# Patient Record
Sex: MALE | Race: BLACK OR AFRICAN AMERICAN | NOT HISPANIC OR LATINO | Employment: UNEMPLOYED | URBAN - METROPOLITAN AREA
[De-identification: names, ages, dates, MRNs, and addresses within clinical notes are randomized per-mention and may not be internally consistent; named-entity substitution may affect disease eponyms.]

---

## 2019-10-06 ENCOUNTER — HOSPITAL ENCOUNTER (EMERGENCY)
Facility: MEDICAL CENTER | Age: 24
End: 2019-10-06
Attending: EMERGENCY MEDICINE

## 2019-10-06 VITALS
TEMPERATURE: 98.6 F | SYSTOLIC BLOOD PRESSURE: 109 MMHG | DIASTOLIC BLOOD PRESSURE: 57 MMHG | RESPIRATION RATE: 14 BRPM | HEART RATE: 65 BPM | OXYGEN SATURATION: 95 %

## 2019-10-06 DIAGNOSIS — F10.929 ACUTE ALCOHOLIC INTOXICATION WITH COMPLICATION (HCC): ICD-10-CM

## 2019-10-06 DIAGNOSIS — F10.929 ALCOHOLIC INTOXICATION WITH COMPLICATION (HCC): ICD-10-CM

## 2019-10-06 DIAGNOSIS — R11.2 NAUSEA AND VOMITING, INTRACTABILITY OF VOMITING NOT SPECIFIED, UNSPECIFIED VOMITING TYPE: ICD-10-CM

## 2019-10-06 PROCEDURE — 96374 THER/PROPH/DIAG INJ IV PUSH: CPT

## 2019-10-06 PROCEDURE — 700105 HCHG RX REV CODE 258: Performed by: EMERGENCY MEDICINE

## 2019-10-06 PROCEDURE — 700111 HCHG RX REV CODE 636 W/ 250 OVERRIDE (IP): Performed by: EMERGENCY MEDICINE

## 2019-10-06 PROCEDURE — 99285 EMERGENCY DEPT VISIT HI MDM: CPT

## 2019-10-06 RX ORDER — ONDANSETRON 2 MG/ML
4 INJECTION INTRAMUSCULAR; INTRAVENOUS ONCE
Status: COMPLETED | OUTPATIENT
Start: 2019-10-06 | End: 2019-10-06

## 2019-10-06 RX ORDER — SODIUM CHLORIDE 9 MG/ML
1000 INJECTION, SOLUTION INTRAVENOUS ONCE
Status: COMPLETED | OUTPATIENT
Start: 2019-10-06 | End: 2019-10-06

## 2019-10-06 RX ADMIN — SODIUM CHLORIDE 1000 ML: 9 INJECTION, SOLUTION INTRAVENOUS at 05:45

## 2019-10-06 RX ADMIN — ONDANSETRON 4 MG: 2 INJECTION INTRAMUSCULAR; INTRAVENOUS at 05:45

## 2019-10-06 ASSESSMENT — LIFESTYLE VARIABLES: DO YOU DRINK ALCOHOL: NO

## 2019-10-06 NOTE — ED PROVIDER NOTES
ED Provider Note    Scribed for Daryl Richardson M.D. by Dariel Daniels. 10/6/2019  4:14 AM    Primary care provider: None noted  Means of arrival: EMS  History obtained from: Primarily nursing  History limited by: Alcohol intoxication    CHIEF COMPLAINT  Chief Complaint   Patient presents with   • Alcohol Intoxication   • N/V       HPI  Sabino Fuchs is a 24 y.o. male who presents to the Emergency Department for evaluation of alcohol intoxication. Per nursing, patient was found sitting in a pool of emesis in the bathroom at [facility]. He admitted to alcohol consumption. He has associated nausea and vomiting. There was no trauma involved, per nursing. He was drowsy on arrival.    Further history is unobtainable secondary to alcohol intoxication.    REVIEW OF SYSTEMS  Pertinent positives include alcohol intoxication, nausea, vomiting.   Pertinent negatives include no trauma.    See HPI for further details.     Further ROS is unobtainable secondary to alcohol intoxication.    PAST MEDICAL HISTORY   Unobtainable at this time secondary to alcohol intoxication.    SURGICAL HISTORY  patient denies any surgical history    SOCIAL HISTORY  Social History     Tobacco Use   • Smoking status: Never Smoker   • Smokeless tobacco: Never Used   Substance Use Topics   • Alcohol use: Yes   • Drug use: Never      Social History     Substance and Sexual Activity   Drug Use Never       FAMILY HISTORY  History reviewed. No pertinent family history.    CURRENT MEDICATIONS  Reviewed.  See Encounter Summary.     ALLERGIES  No Known Allergies    PHYSICAL EXAM  VITAL SIGNS: /79   Pulse 76   Temp 36.5 °C (97.7 °F) (Temporal)   Resp 15   SpO2 100%   Nursing note and vitals reviewed.  Constitutional: Well-developed and well-nourished. No distress. Smells of alcohol.  HENT: Head is normocephalic and atraumatic. Oropharynx is clear and moist without exudate or erythema.   Eyes: Pupils are equal, round, and reactive to light.  Conjunctiva are normal.   Cardiovascular: Normal rate and regular rhythm. No murmur heard. Normal radial pulses.  Pulmonary/Chest: Breath sounds normal. No wheezes or rales.   Abdominal: Soft and non-tender. No distention    Musculoskeletal: Extremities exhibit normal range of motion without edema or tenderness.   Neurological: Arousable to painful stimulus.. No focal deficits noted.  Skin: Skin is warm and dry. No rash.   Psychiatric: Intoxicated.    COURSE & MEDICAL DECISION MAKING  Nursing notes, VS, PMSFHx reviewed in chart.     Review of past medical records shows the patient has no prior records.     4:14 AM - Patient seen and examined at bedside. He presents for alcohol intoxication. No evidence of trauma. He will be treated with zofran for nausea and vomiting. Will continue to monitor. Ordered zofran 4 mg. The differential diagnoses include but are not limited to: Alcohol intoxication    The patient was observed in the emergency department until he regained sobriety.  He is able to ambulate about the emergency department is stable unassisted gait.  Discharged home in improved condition.  HYDRATION: Based on the patient's presentation of Acute Vomiting the patient was given IV fluids. IV Hydration was used because oral hydration was not adequate alone. Upon recheck following hydration, the patient was Improved.     The patient will return for new or worsening symptoms and is stable at the time of discharge.    The patient is referred to a primary physician for blood pressure management, diabetic screening, and for all other preventative health concerns.    DISPOSITION:  Patient will be discharged home in stable condition.    FOLLOW UP:  [facility], Emergency Dept  [address] [address] [phone]    If symptoms worsen       FINAL IMPRESSION  1. Acute alcoholic intoxication with complication (HCC)    2. Nausea and vomiting, intractability of vomiting not specified,  unspecified vomiting type    3. Alcoholic intoxication with complication (HCC)          IDariel (Scribe), am scribing for, and in the presence of, Daryl Richardson M.D..    Electronically signed by: Dariel Daniels (Scribe), 10/6/2019    IDaryl M.D. personally performed the services described in this documentation, as scribed by Dariel Daniels in my presence, and it is both accurate and complete. C    The note accurately reflects work and decisions made by me.  Daryl Richardson  10/6/2019  11:05 AM

## 2019-10-06 NOTE — ED NOTES
Report received from night RN jean claude, pt. In bed with eyes closed, no distress noted, safety precautions in room maintained, will continue to monitor. Care taken over at this time.

## 2019-10-06 NOTE — ED NOTES
Pt. Ambulated around blue pod, pt. Ambulated independently and with a steady gait, pt. Provided with discharge paperwork and teaching, pt. Denies needs or questions. Registrations seeing patient prior to discharge.

## 2019-10-06 NOTE — ED NOTES
Pt observed resting in gurney at this time, presumably sleeping, no s/s of discomfort or distress at this time, unlabored breathing/visible chest  Rise noted, pt repositions self occasionally. Bed in lowest position, room & floor clear, pt in line of sight from RN station.

## 2019-10-06 NOTE — ED TRIAGE NOTES
Pt BIBA from [facility] in bathroom, sitting in pool of emesis. Pt admits to ETOH, continue N/V with EMS. EMS gave 4 mg zofran IV. VSS. Pt is drowsy on arrival.

## 2022-07-05 ENCOUNTER — OFFICE VISIT (OUTPATIENT)
Dept: URGENT CARE | Facility: CLINIC | Age: 27
End: 2022-07-05
Payer: MEDICAID

## 2022-07-05 ENCOUNTER — APPOINTMENT (OUTPATIENT)
Dept: LAB | Facility: MEDICAL CENTER | Age: 27
End: 2022-07-05
Payer: MEDICAID

## 2022-07-05 ENCOUNTER — HOSPITAL ENCOUNTER (OUTPATIENT)
Facility: MEDICAL CENTER | Age: 27
End: 2022-07-05
Payer: MEDICAID

## 2022-07-05 VITALS
RESPIRATION RATE: 14 BRPM | HEART RATE: 57 BPM | WEIGHT: 143 LBS | BODY MASS INDEX: 21.18 KG/M2 | OXYGEN SATURATION: 99 % | TEMPERATURE: 98.7 F | DIASTOLIC BLOOD PRESSURE: 76 MMHG | SYSTOLIC BLOOD PRESSURE: 110 MMHG | HEIGHT: 69 IN

## 2022-07-05 DIAGNOSIS — Z20.2 STD EXPOSURE: ICD-10-CM

## 2022-07-05 PROCEDURE — 99213 OFFICE O/P EST LOW 20 MIN: CPT

## 2022-07-05 RX ORDER — DOXYCYCLINE 100 MG/1
100 CAPSULE ORAL 2 TIMES DAILY
Qty: 14 CAPSULE | Refills: 0 | Status: SHIPPED | OUTPATIENT
Start: 2022-07-05 | End: 2022-07-12

## 2022-07-05 NOTE — PROGRESS NOTES
"Subjective:   Sabino Fuchs is a 27 y.o. male who presents for Exposure to STD (Pt. Was informed of a partner tested Positive for Chlamydia. )      HPI:  This is a 27-year-old male patient who reports exposure to chlamydia, and is requesting treatment today.  He is also requesting additional STI screening.  He reports having multiple partners.  He denies any fevers, chills, urinary symptoms.      ROS per HPI  Medications:    No current outpatient medications on file prior to visit.     No current facility-administered medications on file prior to visit.        Allergies:   Patient has no known allergies.    Problem List:   There is no problem list on file for this patient.       Surgical History:  No past surgical history on file.    Past Social Hx:   Social History     Tobacco Use   • Smoking status: Never Smoker   • Smokeless tobacco: Never Used   Vaping Use   • Vaping Use: Never used   Substance Use Topics   • Alcohol use: Yes   • Drug use: Never          Problem list, medications, and allergies reviewed by myself today in Epic.     Objective:     /76   Pulse (!) 57   Temp 37.1 °C (98.7 °F) (Temporal)   Resp 14   Ht 1.746 m (5' 8.75\")   Wt 64.9 kg (143 lb)   SpO2 99%   BMI 21.27 kg/m²     Physical Exam  Vitals and nursing note reviewed.   Constitutional:       General: He is not in acute distress.     Appearance: Normal appearance. He is normal weight. He is not ill-appearing or toxic-appearing.   HENT:      Head: Normocephalic and atraumatic.   Cardiovascular:      Rate and Rhythm: Normal rate and regular rhythm.      Pulses: Normal pulses.      Heart sounds: Normal heart sounds.   Pulmonary:      Effort: Pulmonary effort is normal.      Breath sounds: Normal breath sounds.   Skin:     Capillary Refill: Capillary refill takes less than 2 seconds.   Neurological:      General: No focal deficit present.      Mental Status: He is alert and oriented to person, place, and time. Mental status is at baseline. "   Psychiatric:         Mood and Affect: Mood normal.         Behavior: Behavior normal.         Thought Content: Thought content normal.         Judgment: Judgment normal.         Assessment/Plan:     Diagnosis and associated orders:     1. STD exposure  cefTRIAXone (ROCEPHIN) 500 mg, lidocaine (XYLOCAINE) 1 % 1.8 mL for IM use    doxycycline (MONODOX) 100 MG capsule    Chlamydia/GC, PCR (Urine)    HEPATITIS PANEL ACUTE(4 COMPONENTS)    HIV AG/AB COMBO ASSAY SCREENING    T.PALLIDUM AB EIA    Referral to establish with [facility] PCP    POCT Urinalysis      Comments/MDM:   Acute problem  Patient will be treated today for STI exposure  STI screening per patient request  Referral to establish with primary care           • Discussed DDx, management options (risks,benefits, and alternatives to planned treatment), natural progression and supportive care.  Expressed understanding and the treatment plan was agreed upon. Questions were encouraged and answered   • Return to urgent care prn if new or worsening sx or if there is no improvement in condition prn.     • Advised the patient to follow-up with the primary care physician for recheck, reevaluation, and consideration of further management.    I personally reviewed prior external notes and test results pertinent to today's visit.  I have independently reviewed and interpreted all diagnostics ordered during this urgent care acute visit.       Please note that this dictation was created using voice recognition software. I have made a reasonable attempt to correct obvious errors, but I expect that there are errors of grammar and possibly content that I did not discover before finalizing the note.    This note was electronically signed by TOMY Moser

## 2022-07-06 LAB
FORWARD REASON: SPWHY: NORMAL
FORWARDED TO LAB: SPWHR: NORMAL
SPECIMEN SENT: SPWT1: NORMAL

## 2022-07-16 ENCOUNTER — TELEPHONE (OUTPATIENT)
Dept: URGENT CARE | Facility: PHYSICIAN GROUP | Age: 27
End: 2022-07-16
Payer: MEDICAID

## 2022-07-16 NOTE — TELEPHONE ENCOUNTER
----- Message from JESSICA Marina sent at 7/7/2022 11:29 AM PDT -----  Establish care with Primary Care Physician.

## 2022-08-18 ENCOUNTER — HOSPITAL ENCOUNTER (OUTPATIENT)
Facility: MEDICAL CENTER | Age: 27
End: 2022-08-18
Attending: PHYSICIAN ASSISTANT
Payer: MEDICAID

## 2022-08-18 ENCOUNTER — HOSPITAL ENCOUNTER (OUTPATIENT)
Dept: RADIOLOGY | Facility: MEDICAL CENTER | Age: 27
End: 2022-08-18
Attending: PHYSICIAN ASSISTANT
Payer: MEDICAID

## 2022-08-18 ENCOUNTER — OFFICE VISIT (OUTPATIENT)
Dept: URGENT CARE | Facility: CLINIC | Age: 27
End: 2022-08-18
Payer: MEDICAID

## 2022-08-18 VITALS
HEART RATE: 60 BPM | HEIGHT: 68 IN | SYSTOLIC BLOOD PRESSURE: 122 MMHG | BODY MASS INDEX: 21.22 KG/M2 | WEIGHT: 140 LBS | RESPIRATION RATE: 14 BRPM | OXYGEN SATURATION: 100 % | TEMPERATURE: 97.5 F | DIASTOLIC BLOOD PRESSURE: 78 MMHG

## 2022-08-18 DIAGNOSIS — M79.642 LEFT HAND PAIN: ICD-10-CM

## 2022-08-18 DIAGNOSIS — M25.571 ACUTE RIGHT ANKLE PAIN: ICD-10-CM

## 2022-08-18 DIAGNOSIS — Z71.1 CONCERN ABOUT STD IN MALE WITHOUT DIAGNOSIS: ICD-10-CM

## 2022-08-18 DIAGNOSIS — S82.399A: ICD-10-CM

## 2022-08-18 LAB
APPEARANCE UR: NORMAL
BILIRUB UR STRIP-MCNC: NORMAL MG/DL
COLOR UR AUTO: NORMAL
GLUCOSE UR STRIP.AUTO-MCNC: NEGATIVE MG/DL
KETONES UR STRIP.AUTO-MCNC: NORMAL MG/DL
LEUKOCYTE ESTERASE UR QL STRIP.AUTO: NEGATIVE
NITRITE UR QL STRIP.AUTO: NEGATIVE
PH UR STRIP.AUTO: 7 [PH] (ref 5–8)
PROT UR QL STRIP: NEGATIVE MG/DL
RBC UR QL AUTO: NEGATIVE
SP GR UR STRIP.AUTO: 1.02
UROBILINOGEN UR STRIP-MCNC: 0.2 MG/DL

## 2022-08-18 PROCEDURE — 73130 X-RAY EXAM OF HAND: CPT | Mod: LT

## 2022-08-18 PROCEDURE — 73610 X-RAY EXAM OF ANKLE: CPT | Mod: RT

## 2022-08-18 PROCEDURE — 81002 URINALYSIS NONAUTO W/O SCOPE: CPT | Performed by: PHYSICIAN ASSISTANT

## 2022-08-18 PROCEDURE — 99213 OFFICE O/P EST LOW 20 MIN: CPT | Performed by: PHYSICIAN ASSISTANT

## 2022-08-18 RX ORDER — CYCLOBENZAPRINE HCL 10 MG
10 TABLET ORAL 3 TIMES DAILY PRN
COMMUNITY
End: 2022-09-07

## 2022-08-18 NOTE — PROGRESS NOTES
Subjective:   Flo Fuchs is a 27 y.o. male who presents for Ankle Pain (X3 weeks, pain since 'rolling' ankle, stiffness in the back), Joint Injection (X1 day left second finger stiff and in pain), and Sexually Transmitted Diseases  Patient presents with chief complaint of right ankle pain.  He reports an injury approximately 10 years ago that led to a laceration near his right Achilles tendon.  He did not have an Achilles tendon injury however.  3 weeks ago he reports that he rolled his ankle laterally he did have some swelling and pain at the area.  The swelling has improved dramatically although there is still some present.  He notes pain at the lateral malleolus and along the lateral side of the Achilles tendon.  He denies any significant numbness or tingling.  He has tried ice.    He also woke yesterday with atraumatic pain and mild swelling to the left index MCP and PIP area.  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  He is unsure if he might of jammed it or injured it at work.  He notes some mild swelling.  No redness.  No fever or chills.  Some pain with flexion of the left index finger.  No other constitutional symptoms    He is also requesting STI testing.  No symptoms.  No penile discharge abdominal pain nausea or vomiting.  No lesions or rashes.  No itching.  He has engaged in risky sexual behavior.        ROS    Medications:  cyclobenzaprine Tabs    Allergies:             Patient has no allergy information on record.    Surgical History:       No past surgical history on file.    Past Social Hx:  Flo Fuchs  reports that he has never smoked. He has never used smokeless tobacco. He reports current alcohol use of about 1.2 oz per week. He reports current drug use. Frequency: 4.00 times per week. Drug: Marijuana.     Past Family Hx:   Flo Fuchs family history is not on file.       Problem list, medications, and allergies reviewed by  "myself today in Epic.     Objective:     /78 (BP Location: Left arm, Patient Position: Sitting, BP Cuff Size: Adult)   Pulse 60   Temp 36.4 °C (97.5 °F) (Temporal)   Resp 14   Ht 1.727 m (5' 8\")   Wt 63.5 kg (140 lb)   SpO2 100%   BMI 21.29 kg/m²     Physical Exam  Musculoskeletal:        Hands:       Comments: Mild tenderness of the left MCP and PIP joint.  Range of motion full.  Pulses intact.  Sensation intact.  Minimal swelling without deformity.  No ecchymosis.  Normal capillary refill.   Feet:      Comments: Mild tenderness to the right calcaneofibular and posterior talofibular ligament region.  Achilles tendon appears to be intact.  Negative Lora test.  There is mild bony tenderness at the right posterior lateral fibula.  No tenderness over the navicular region.  No tenderness over the base of the fifth metatarsal.  Distal pulses intact.  Mild pain with supination and eversion.          Assessment/Plan:     Diagnosis and Associated Orders:     1. Acute right ankle pain  - DX-ANKLE 3+ VIEWS RIGHT; Future  - DX-HAND 3+ LEFT; Future    2. Concern about STD in male without diagnosis  - POCT Urinalysis  - HIV AG/AB COMBO ASSAY SCREENING; Future  - T.PALLIDUM AB EIA; Standing    3. Left hand pain  - DX-HAND 3+ LEFT; Future    4. Closed fracture of posterior malleolus, unspecified laterality, initial encounter    Other orders  - cyclobenzaprine (FLEXERIL) 10 mg Tab; Take 10 mg by mouth 3 times a day as needed.      Comments/MDM:    Patient is interested in obtaining x-rays.  We have no x-ray available here today in the office.  Orders placed for him to obtain these next-door.  Recommend ice.  He has been ambulating without difficulty and working.  No indication for immobilizing him at this point.  Monitor for fevers chills or increased welling to the hand.  Red flags discussed.  STD testing.  Patient to notify me with results of testing through [facility].  He understands I do not have access " to his test results.  Safe sex practices discussed.  All questions are answered.    ADDENDUM 8/19/22: xray report:    RADIOLOGY RESULTS   DX-ANKLE 3+ VIEWS RIGHT    Result Date: 8/18/2022 8/18/2022 3:20 PM HISTORY/REASON FOR EXAM:  Pain/Deformity Following Trauma. Ankle pain and swelling TECHNIQUE/EXAM DESCRIPTION AND NUMBER OF VIEWS:  3 views of the RIGHT ankle. COMPARISON: None. FINDINGS: On the lateral projection there is abnormal lucency suspicious for posterior malleolus fracture with intra-articular extension. There is no significant displacement. The visualized osseous structures are in anatomic alignment.  Ankle mortise is symmetric. The joint spaces are preserved. Bone mineralization is age-appropriate..     Findings suspicious for nondisplaced posterior malleolus fracture.     DX-HAND 3+ LEFT    Result Date: 8/18/2022 8/18/2022 3:21 PM HISTORY/REASON FOR EXAM:  Atraumatic Pain/Swelling/Deformity; left index finger mcp, pip, metacarpal. Pain and swelling TECHNIQUE/EXAM DESCRIPTION AND NUMBER OF VIEWS:  3 views of the LEFT hand. COMPARISON: None FINDINGS: There is no fracture or dislocation. The visualized osseous structures are in anatomic alignment. The joint spaces are preserved. Bone mineralization is age-appropriate..     No acute osseous abnormality.         *X-rays were reviewed and interpreted independently by me. I agree with the radiologist's findings     I contacted the patient and discussed his imaging findings with colleagues.  Patient recommended to return to clinic for posterior splint or boot and non weight bearing.  Patient states he will return to clinic Saturday.  He cannot make it in today.  Will also place referral to [facility] sports medicine for follow up of his injury.    I personally reviewed prior external notes and test results pertinent to today's visit.  Red flags discussed as well as indications to present to the Emergency Department.  Supportive care, natural history, differential  diagnoses, and indications for immediate follow-up discussed.  Patient expresses understanding and agrees to plan.  Patient denies any other questions or concerns.    Follow-up with the primary care physician for recheck, reevaluation, and consideration of further management.      Please note that this dictation was created using voice recognition software. I have made a reasonable attempt to correct obvious errors, but I expect that there are errors of grammar and possibly content that I did not discover before finalizing the note.    This note was electronically signed by Lacey Yo PA-C

## 2022-08-21 ENCOUNTER — OFFICE VISIT (OUTPATIENT)
Dept: URGENT CARE | Facility: CLINIC | Age: 27
End: 2022-08-21
Payer: MEDICAID

## 2022-08-21 VITALS
SYSTOLIC BLOOD PRESSURE: 124 MMHG | TEMPERATURE: 97.1 F | WEIGHT: 140 LBS | HEIGHT: 69 IN | HEART RATE: 58 BPM | DIASTOLIC BLOOD PRESSURE: 76 MMHG | BODY MASS INDEX: 20.73 KG/M2 | OXYGEN SATURATION: 100 %

## 2022-08-21 DIAGNOSIS — S82.391D CLOSED FRACTURE OF POSTERIOR MALLEOLUS OF RIGHT TIBIA WITH ROUTINE HEALING, SUBSEQUENT ENCOUNTER: ICD-10-CM

## 2022-08-21 PROCEDURE — 99213 OFFICE O/P EST LOW 20 MIN: CPT | Performed by: FAMILY MEDICINE

## 2022-08-21 ASSESSMENT — ENCOUNTER SYMPTOMS: ROS SKIN COMMENTS: NO ABRASION OR LACERATION

## 2022-08-21 NOTE — PROGRESS NOTES
"Ruslan Fuchs is a 27 y.o. male who presents with Ankle Pain (Previsouly seen, still in a lot of pain)            Flo was seen 8/18/2022 with right ankle pain.  Twisting injury approximately 3 weeks prior to that.  Moderate to severe pain.  He has been ambulatory with a limp.  Possible posterior malleolus fracture found at that time.  He presents today to review x-ray and discuss follow-up options.  No other aggravating or alleviating factors.      Review of Systems   Skin:         No abrasion or laceration              Objective     /76 (BP Location: Right arm, Patient Position: Sitting, BP Cuff Size: Adult long)   Pulse (!) 58   Temp 36.2 °C (97.1 °F) (Temporal)   Ht 1.746 m (5' 8.75\")   Wt 63.5 kg (140 lb)   SpO2 100%   BMI 20.83 kg/m²      Physical Exam  Musculoskeletal:      Comments: Right ankle: Diffusely tender and swollen.  Guards with range of motion.  Distal neurovascular intact.   Skin:     General: Skin is warm and dry.                           Assessment & Plan   X-ray ankle 8/18/2022.  Images reviewed with Flo.  Radiology read suspicious for posterior malleolus fracture with intra-articular extension.     Urgent care visit 8/18/2022 reviewed    1. Closed fracture of posterior malleolus of right tibia with routine healing, subsequent encounter    - Referral to Orthopedics    Recommend no weightbearing.  Crutches fitted.  Tall walking boot fitted.  Work restrictions note written.  Follow-up Ortho.             "

## 2022-08-21 NOTE — LETTER
August 21, 2022         Patient: Flo Fuchs   YOB: 1995   Date of Visit: 8/21/2022           To Whom it May Concern:    Flo Fuchs was seen in my clinic on 8/21/2022. No weight bearing right foot until cleared by orthopedic surgery. Please allow crutches when walking.        Sincerely,           Lobo Mccoy M.D.  Electronically Signed

## 2022-08-23 ENCOUNTER — OCCUPATIONAL MEDICINE (OUTPATIENT)
Dept: URGENT CARE | Facility: CLINIC | Age: 27
End: 2022-08-23
Payer: COMMERCIAL

## 2022-08-23 VITALS
OXYGEN SATURATION: 100 % | BODY MASS INDEX: 20.73 KG/M2 | HEART RATE: 85 BPM | RESPIRATION RATE: 16 BRPM | SYSTOLIC BLOOD PRESSURE: 122 MMHG | HEIGHT: 69 IN | TEMPERATURE: 97.5 F | WEIGHT: 140 LBS | DIASTOLIC BLOOD PRESSURE: 82 MMHG

## 2022-08-23 DIAGNOSIS — S82.391D CLOSED FRACTURE OF POSTERIOR MALLEOLUS OF RIGHT TIBIA WITH ROUTINE HEALING, SUBSEQUENT ENCOUNTER: ICD-10-CM

## 2022-08-23 PROCEDURE — 99214 OFFICE O/P EST MOD 30 MIN: CPT | Performed by: PHYSICIAN ASSISTANT

## 2022-08-23 ASSESSMENT — ENCOUNTER SYMPTOMS
VOMITING: 0
FOCAL WEAKNESS: 0
WEAKNESS: 0
MYALGIAS: 0
CHILLS: 0
TINGLING: 0
SENSORY CHANGE: 0
NAUSEA: 0
FEVER: 0

## 2022-08-23 NOTE — LETTER
[facility] [place] [id number] [place] [address]  Phone:  [phone] - Fax:  [phone]   [facility] Progress Report and Disability Certification  Date of Service: 8/23/2022   No Show:  No  Date / Time of Next Visit:   Transfer of Care to Orthopedics-- Appointment pending    Claim Information   Patient Name: Flo Fuchs  Claim Number:     Employer: [employer]  Date of Injury: 8/16/2022     Insurer / TPA: [employer]  ID / SSN:     Occupation:   Diagnosis: The encounter diagnosis was Closed fracture of posterior malleolus of right tibia with routine healing, subsequent encounter.    Medical Information   Related to Industrial Injury? Yes    Subjective Complaints:  DOI: 8/16/2022. Patient reports acute severe right ankle pain after slipping on the wet Tarmac at his work. He heard a pop when his foot slipped. He was seen on 8/18/2022 and did not file a Worker's Comp claim at that time. He had rolled his ankle 3 weeks prior but was improving until the incident at work on 8/16/2022. X-rays were performed and showed a posterior malleolus fracture. He has been using crutched and non-weight bearing. He is using Ibuprofen for pain. Patient reports history of right foot stress fracture.    Objective Findings: Vitals reviewed.  Right lower leg- TTP over ankle. Limited ROM. Leg in boot. Distal n/v intact. Ambulating with crutches.    Pre-Existing Condition(s):     Assessment:   Condition Same    Status: Discharged / Care Transfer  Comments:Transfer of care to Orthopedics  Permanent Disability:No    Plan: Transfer Care  Comments:OTC NSAIDS. RICE. Non-weight bearing. Tall boot and crutches, Referral placed to Orthopedics.    Diagnostics: X-ray  Comments:posterior malleolar fracture right    Comments:       Disability Information   Status: Released to Restricted Duty    From:  8/23/2022  Through:   Restrictions are:  Temporary  Comments:restrictions until follow-up with Orthopedics   Physical Restrictions   Sitting:    Standing:  < or = to 1 hr/day Stoopin hrs/day Bendin hrs/day   Squattin hrs/day Walking:  < or = to 2 hrs/day Climbin hrs/day Pushin hrs/day   Pullin hrs/day Other:    Reaching Above Shoulder (L):   Reaching Above Shoulder (R):       Reaching Below Shoulder (L):    Reaching Below Shoulder (R):      Not to exceed Weight Limits   Carrying(hrs):   Weight Limit(lb): < or = to 10 pounds Lifting(hrs):   Weight  Limit(lb): < or = to 10 pounds   Comments: Non-weight bearing on right leg. Must wear boot and use crutches at all times at work.    Repetitive Actions   Hands: i.e. Fine Manipulations from Grasping:     Feet: i.e. Operating Foot Controls:     Driving / Operate Machinery:     Health Care Provider’s Original or Electronic Signature  Marie Thakur P.A.-C. Health Care Provider’s Original or Electronic Signature    Slava Prater MD         Clinic Name / Location: [address] [facility]  [place] [address] Clinic Phone Number: Dept: [phone]   Appointment Time: 6:30 Pm Visit Start Time: 6:53 PM   Check-In Time:  6:50 Pm Visit Discharge Time:  7:08 PM    Original-Treating Physician or Chiropractor    Page 2-Insurer/TPA    Page 3-Employer    Page 4-Employee

## 2022-08-23 NOTE — LETTER
"EMPLOYEE’S CLAIM FOR COMPENSATION/ REPORT OF INITIAL TREATMENT  FORM C-4    EMPLOYEE’S CLAIM - PROVIDE ALL INFORMATION REQUESTED   First Name  Flo Last Name  Choice Birthdate                    1995                Sex  male Claim Number (Insurer’s Use Only)    Home Address  [address] Age  27 y.o. Height  1.753 m (5' 9\") Weight  63.5 kg (140 lb) [facility]     [facility] Zip  [address] Telephone  [phone] (home)    Mailing Address  [address] [facility] Zip  [address] Primary Language Spoken  English    Insurer   Third-Party   [employer]   Employee's Occupation (Job Title) When Injury or Occupational Disease Occurred      Employer's Name/Company Name  [employer]  Telephone  [phone]    Office Mail Address (Number and Street)   [address] [facility]      Date of Injury  8/16/2022               Hours Injury  11:40 PM Date Employer Notified  8/21/2022 Last Day of Work after Injury     or Occupational Disease  8/22/2022 Supervisor to Whom Injury     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 (Be specific an answer in detail. Use additional sheet if necessary)  As I was moving the tow bar of the GPU to connect to the tug, my right food slid out from under me. It was wet and raining on the tarmac at the time. I heard a slight pop when I stood up.   If you believe that you have an occupational disease, when did you first have knowledge of the disability and it relationship to your employment?  N/A Witnesses to the Accident  N/A      Nature of Injury or Occupational Disease  Contusion  Part(s) of Body Injured or Affected  Ankle (R), " ,     I certify that the above is true and correct to the best of my knowledge and that I have provided this information in order to obtain the benefits of [place]  I hereby authorize any physician, chiropractor, surgeon, practitioner, or other person, any hospital, including [facility] or [facility], any medical service organization, any insurance company, or other institution or organization to release to each other, any medical or other information, including benefits paid or payable, pertinent to this injury or disease, except information relative to diagnosis, treatment and/or counseling for AIDS, psychological conditions, alcohol or controlled substances, for which I must give specific authorization.  A Photostat of this authorization shall be as valid as the original.     Date   Place Employee’s Original or  *Electronic Signature   THIS REPORT MUST BE COMPLETED AND MAILED WITHIN 3 WORKING DAYS OF TREATMENT   Place  [facility]  Name of Facility  [facility]   Date  8/23/2022 Diagnosis and Description of Injury or Occupational Disease  (S82.391D) Closed fracture of posterior malleolus of right tibia with routine healing, subsequent encounter Is there evidence the injured employee was under the influence of alcohol and/or another controlled substance at the time of accident?  ? No ? Yes (if yes, please explain)    Hour  6:53 PM   The encounter diagnosis was Closed fracture of posterior malleolus of right tibia with routine healing, subsequent encounter. No   Treatment  RICE. OTC NSAIDS. Non-weight bearing on right leg and crutches. CAM boot. Referral placed to transfer care to Orthopedics  Have you advised the patient to remain off work five days or     more?    X-Ray Findings  Positive  Comments:posterior malleolar fracture right  leg   ? Yes Indicate dates:   From   To     "  From information given by the employee, together with medical evidence, can        you directly connect this injury or occupational disease as job incurred?  Yes ? No If no, is the injured employee capable of:  ? full duty  No ? modified duty  Yes   Is additional medical care by a physician indicated?  Yes If Modified Duty, Specify any Limitations / Restrictions  See D-39   Do you know of any previous injury or disease contributing to this condition or occupational disease?  ? Yes ? No (Explain if yes)                          Yes  Comments:rolled ankle 3 weeks prior to injury. History of right foot stress fracture   Date  8/23/2022 Print Health Care Provider's   Hanna Thakur P.A.-C. I certify the employer’s copy of  this form was mailed on:   Address  [address] [place] [address] Insurer’s Use Only     [facility]  [address]    Provider’s Tax ID Number  [id number] Telephone  Dept: [phone]             Health Care Provider’s Original or Electronic Signature  e-HANNA Barney P.A.-C. Degree (MD,DO, DC,PAOsitoC,APRN)   PA-C      * Complete and attach Release of Information (Form C-4A) when injured employee signs C-4 Form electronically  ORIGINAL - TREATING HEALTHCARE PROVIDER PAGE 2 - INSURER/TPA PAGE 3 - EMPLOYER PAGE 4 - EMPLOYEE             Form C-4 (rev.08/21)           BRIEF DESCRIPTION OF RIGHTS AND BENEFITS  (Pursuant to [place] 616C.050)    Notice of Injury or Occupational Disease (Incident Report Form C-1): If an injury or occupational disease (OD) arises out of and in the course of employment, you must provide written notice to your employer as soon as practicable, but no later than 7 days after the accident or OD. Your employer shall maintain a sufficient supply of the required forms.    Claim for Compensation (Form C-4): If medical treatment is sought, the form C-4 is available at the place of initial treatment. A completed \"Claim for Compensation\" (Form C-4) must be filed within 90 days " after an accident or OD. The treating physician or chiropractor must, within 3 working days after treatment, complete and mail to the employer, the employer's insurer and third-party , the Claim for Compensation.    Medical Treatment: If you require medical treatment for your on-the-job injury or OD, you may be required to select a physician or chiropractor from a list provided by your workers’ compensation insurer, if it has contracted with an Organization for Managed Care (MCO) or Preferred Provider Organization (PPO) or providers of health care. If your employer has not entered into a contract with an MCO or PPO, you may select a physician or chiropractor from the Panel of Physicians and Chiropractors. Any medical costs related to your industrial injury or OD will be paid by your insurer.    Temporary Total Disability (TTD): If your doctor has certified that you are unable to work for a period of at least 5 consecutive days, or 5 cumulative days in a 20-day period, or places restrictions on you that your employer does not accommodate, you may be entitled to TTD compensation.    Temporary Partial Disability (TPD): If the wage you receive upon reemployment is less than the compensation for TTD to which you are entitled, the insurer may be required to pay you TPD compensation to make up the difference. TPD can only be paid for a maximum of 24 months.    Permanent Partial Disability (PPD): When your medical condition is stable and there is an indication of a PPD as a result of your injury or OD, within 30 days, your insurer must arrange for an evaluation by a rating physician or chiropractor to determine the degree of your PPD. The amount of your PPD award depends on the date of injury, the results of the PPD evaluation, your age and wage.    Permanent Total Disability (PTD): If you are medically certified by a treating physician or chiropractor as permanently and totally disabled and have been granted a  PTD status by your insurer, you are entitled to receive monthly benefits not to exceed 66 2/3% of your average monthly wage. The amount of your PTD payments is subject to reduction if you previously received a lump-sum PPD award.    Vocational Rehabilitation Services: You may be eligible for vocational rehabilitation services if you are unable to return to the job due to a permanent physical impairment or permanent restrictions as a result of your injury or occupational disease.    Transportation and Per Mike Reimbursement: You may be eligible for travel expenses and per mike associated with medical treatment.    Reopening: You may be able to reopen your claim if your condition worsens after claim closure.     Appeal Process: If you disagree with a written determination issued by the insurer or the insurer does not respond to your request, you may appeal to the Department of Administration, , by following the instructions contained in your determination letter. You must appeal the determination within 70 days from the date of the determination letter at [address], [address], [place], [place] [address], or [address], [address], [place] [address]. If you disagree with the  decision, you may appeal to the Department of Administration, . You must file your appeal within 30 days from the date of the  decision letter at [address], [address], [place], [place] [address], or [address], [address], [place], [place] [address]. If you disagree with a decision of an , you may file a petition for judicial review with the District Court. You must do so within 30 days of the Appeal Officer’s decision. You may be represented by an  at your own expense or you may contact the [facility] for possible representation.    [place]  for Injured Workers (NAIW): If you disagree with a  decision,  you may request that [facility] represent you without charge at an  Hearing. For information regarding denial of benefits, you may contact the [facility] at: [address], [address], [place] [address], [phone], or [address], [address], [place] [address], [phone]    To File a Complaint with the Division: If you wish to file a complaint with the  of the Division of Industrial Relations (DIR),  please contact the Workers’ Compensation Section, [address], [address], [place], [place] [address], telephone [phone], or [address], [address], [place], [place] [address], telephone [phone].    For assistance with Workers’ Compensation Issues: You may contact the [facility] Office for Consumer Health Assistance, [address], [address], [place], [place] [address], Toll Free 1-375.875.2519, Web site: [email or web address].gov/Programs/WHITNEY E-mail: [email or web address]              __________________________________________________________________                                    _________________            Employee Name / Signature                                                                                                                            Date                                                                                                                                                                                                                              D-2 (rev. 10/20)

## 2022-08-24 ENCOUNTER — TELEPHONE (OUTPATIENT)
Dept: URGENT CARE | Facility: CLINIC | Age: 27
End: 2022-08-24

## 2022-08-24 NOTE — TELEPHONE ENCOUNTER
Pt. Needs a recollection for GC/Chlamydia as it was collected in wrong tube as he has [place].  Please collect in a [place] [employer] vial from 07-05-22.  I called pt. And LMTCB.

## 2022-09-07 ENCOUNTER — OCCUPATIONAL MEDICINE (OUTPATIENT)
Dept: URGENT CARE | Facility: CLINIC | Age: 27
End: 2022-09-07
Payer: COMMERCIAL

## 2022-09-07 VITALS
OXYGEN SATURATION: 98 % | DIASTOLIC BLOOD PRESSURE: 74 MMHG | WEIGHT: 140 LBS | RESPIRATION RATE: 20 BRPM | BODY MASS INDEX: 20.73 KG/M2 | HEIGHT: 69 IN | SYSTOLIC BLOOD PRESSURE: 116 MMHG | HEART RATE: 68 BPM | TEMPERATURE: 98 F

## 2022-09-07 DIAGNOSIS — S82.391D CLOSED FRACTURE OF POSTERIOR MALLEOLUS OF RIGHT TIBIA WITH ROUTINE HEALING, SUBSEQUENT ENCOUNTER: ICD-10-CM

## 2022-09-07 PROCEDURE — 99214 OFFICE O/P EST MOD 30 MIN: CPT | Performed by: NURSE PRACTITIONER

## 2022-09-07 ASSESSMENT — ENCOUNTER SYMPTOMS
FEVER: 0
NAUSEA: 0
SORE THROAT: 0
DIZZINESS: 0
SHORTNESS OF BREATH: 0
MYALGIAS: 0
CHILLS: 0
VOMITING: 0
EYE REDNESS: 0

## 2022-09-07 NOTE — LETTER
"   [facility]  [address] [place] [address]  Phone:  [phone] - Fax:  [phone]   [facility] Progress Report and Disability Certification  Date of Service: 9/7/2022   No Show:  No  Date / Time of Next Visit: Follow up with Orthopedic DrChen   Claim Information   Patient Name: Flo Fuchs  Claim Number:     Employer: [employer]  Date of Injury: 8/16/2022     Insurer / TPA: [employer]  ID / SSN:     Occupation:   Diagnosis: The encounter diagnosis was Closed fracture of posterior malleolus of right tibia with routine healing, subsequent encounter.    Medical Information   Related to Industrial Injury? Yes    Subjective Complaints:  DOI: 8/16/2022. \"Patient reports acute severe right ankle pain after slipping on the wet Tarmac at his work. He heard a pop when his foot slipped. He was seen on 8/18/2022 and did not file a Worker's Comp claim at that time. He had rolled his ankle 3 weeks prior but was improving until the incident at work on 8/16/2022. X-rays were performed and showed a posterior malleolus fracture.\"  Patient returns to the urgent care having slight improvement in his symptoms.  Does still have intermittent pain only requiring medication as needed.  Patient is not yet followed up with orthopedics as he does not have a scheduled appointment.  Has been wearing the tall walking cam boot as directed and using crutches.  Patient did return back to work however is going out on short-term disability however having difficulty getting approval with claim agency.   Objective Findings: Right ankle: wearing tall walking cam boot. Skin mildly edematous, tenderness to palpation.  +2 pedal pulses, sensation intact distally, neurovascular intact.+AROM with discomfort. A&Ox4,    Pre-Existing Condition(s):     Assessment:   Condition Same    Status: Discharged / Care Transfer  Permanent Disability:No    Plan: Transfer Care "    Diagnostics:      Comments:       Disability Information   Status: Released to Restricted Duty    From:  2022  Through: 2022 Restrictions are: Temporary   Physical Restrictions   Sitting:    Standing:  < or = to 2 hrs/day Stoopin hrs/day Bend[place] hrs/day   Squattin hrs/day Walkin hrs/day Climbin hrs/day Pushing:      Pulling:    Other:    Reaching Above Shoulder (L):   Reaching Above Shoulder (R):       Reaching Below Shoulder (L):    Reaching Below Shoulder (R):      Not to exceed Weight Limits   Carrying(hrs):   Weight Limit(lb): < or = to 10 pounds Lifting(hrs):   Weight  Limit(lb): < or = to 10 pounds   Comments: Encouraged to continue wearing tall walking cam boot and use crutches as directed.  Continue with Tylenol Motrin as needed for pain.  We will continue with temporary work restrictions.  At this time we will transfer care to occupational medicine and orthopedics.    Repetitive Actions   Hands: i.e. Fine Manipulations from Grasping:     Feet: i.e. Operating Foot Controls: 0 hrs/day   Driving / Operate Machinery:     Health Care Provider’s Original or Electronic Signature  JESSICA Molina Health Care Provider’s Original or Electronic Signature    Slava Praetr MD         Clinic Name / Location: [address] [place] [address] Clinic Phone Number: Dept: [phone]   Appointment Time: 2:30 Pm Visit Start Time: 3:31 PM   Check-In Time:  2:18 Pm Visit Discharge Time:     Original-Treating Physician or Chiropractor    Page 2-Insurer/TPA    Page 3-Employer    Page 4-Employee

## 2022-09-08 ENCOUNTER — HOSPITAL ENCOUNTER (OUTPATIENT)
Facility: MEDICAL CENTER | Age: 27
End: 2022-09-08
Attending: FAMILY MEDICINE
Payer: MEDICAID

## 2022-09-08 ENCOUNTER — OFFICE VISIT (OUTPATIENT)
Dept: URGENT CARE | Facility: CLINIC | Age: 27
End: 2022-09-08
Payer: MEDICAID

## 2022-09-08 VITALS
OXYGEN SATURATION: 100 % | RESPIRATION RATE: 16 BRPM | TEMPERATURE: 97.4 F | HEIGHT: 69 IN | WEIGHT: 140 LBS | BODY MASS INDEX: 20.73 KG/M2 | HEART RATE: 58 BPM | SYSTOLIC BLOOD PRESSURE: 110 MMHG | DIASTOLIC BLOOD PRESSURE: 68 MMHG

## 2022-09-08 DIAGNOSIS — Z72.51 HIGH RISK HETEROSEXUAL BEHAVIOR: ICD-10-CM

## 2022-09-08 LAB
FORWARD REASON: SPWHY: NORMAL
FORWARDED TO LAB: SPWHR: NORMAL
SPECIMEN SENT: SPWT1: NORMAL

## 2022-09-08 PROCEDURE — 99214 OFFICE O/P EST MOD 30 MIN: CPT | Performed by: FAMILY MEDICINE

## 2022-09-08 NOTE — PROGRESS NOTES
"  Subjective:      27 y.o. male presents to urgent care for STI check.  Her partner that he was with this she tested positive for chlamydia.  He came into urgent care in early July and had gonorrhea and Chlamydia testing, unfortunately this was placed in the wrong container so that never resulted.  He was prophylactically treated with ceftriaxone 500 mg IM and doxycycline.  He denies any penile discharge, rashes, or lesions.  He is currently sexually active with multiple, female partners, and uses condoms inconsistently.    He denies any other questions or concerns at this time.    Current problem list, medication, and past medical/surgical history were reviewed in Epic.    ROS  See HPI     Objective:      /68 (BP Location: Left arm, Patient Position: Sitting)   Pulse (!) 58   Temp 36.3 °C (97.4 °F) (Temporal)   Resp 16   Ht 1.753 m (5' 9\")   Wt 63.5 kg (140 lb)   SpO2 100%   BMI 20.67 kg/m²     Physical Exam  Constitutional:       General: He is not in acute distress.     Appearance: He is not diaphoretic.   Cardiovascular:      Rate and Rhythm: Normal rate and regular rhythm.      Heart sounds: Normal heart sounds.   Pulmonary:      Effort: Pulmonary effort is normal. No respiratory distress.      Breath sounds: Normal breath sounds.   Abdominal:      General: Bowel sounds are normal.      Palpations: Abdomen is soft.      Tenderness: There is no abdominal tenderness. There is no right CVA tenderness or left CVA tenderness.   Neurological:      Mental Status: He is alert.   Psychiatric:         Mood and Affect: Affect normal.         Judgment: Judgment normal.     Assessment/Plan:     1. High risk heterosexual behavior  Full STD panel has been ordered.  Urine for gonorrhea and chlamydia has been sent.  He knows to go to the lab for the rest of the work-up, he was given the orders.  We will treat appropriately once resulted.  - Chlamydia/GC, PCR (Urine); Future  - RPR (SYPHILIS); Future  - HIV AG/AB " COMBO ASSAY SCREENING; Future  - HEP C VIRUS ANTIBODY; Future  - HEP B SURFACE ANTIGEN; Future        Instructed to return to Urgent Care or nearest Emergency Department if symptoms fail to improve, for any change in condition, further concerns, or new concerning symptoms. Patient states understanding of the plan of care and discharge instructions.    Nicole Starks M.D.

## 2022-09-08 NOTE — PROGRESS NOTES
"Subjective:   Flo Fuchs  is a 27 y.o. male who presents for Work-Related Injury (WC FV DOI: 08-16-22 R Ankle)    DOI: 8/16/2022. \"Patient reports acute severe right ankle pain after slipping on the wet Tarmac at his work. He heard a pop when his foot slipped. He was seen on 8/18/2022 and did not file a Worker's Comp claim at that time. He had rolled his ankle 3 weeks prior but was improving until the incident at work on 8/16/2022. X-rays were performed and showed a posterior malleolus fracture.\"  Patient returns to the urgent care having slight improvement in his symptoms.  Does still have intermittent pain only requiring medication as needed.  Patient is not yet followed up with orthopedics as he does not have a scheduled appointment.  Has been wearing the tall walking cam boot as directed and using crutches.  Patient did return back to work however is going out on short-term disability however having difficulty getting approval with claim agency.   HPI  Review of Systems   Constitutional:  Negative for chills and fever.   HENT:  Negative for sore throat.    Eyes:  Negative for redness.   Respiratory:  Negative for shortness of breath.    Cardiovascular:  Negative for chest pain.   Gastrointestinal:  Negative for nausea and vomiting.   Genitourinary:  Negative for dysuria.   Musculoskeletal:  Positive for joint pain. Negative for myalgias.   Skin:  Negative for rash.   Neurological:  Negative for dizziness.   No Known Allergies   Objective:   /74 (BP Location: Left arm, Patient Position: Sitting, BP Cuff Size: Adult)   Pulse 68   Temp 36.7 °C (98 °F) (Temporal)   Resp 20   Ht 1.753 m (5' 9\")   Wt 63.5 kg (140 lb)   SpO2 98%   BMI 20.67 kg/m²   Physical Exam  Vitals and nursing note reviewed.   Constitutional:       General: He is not in acute distress.     Appearance: He is well-developed.   HENT:      Head: Normocephalic and atraumatic.      Right Ear: External ear normal.      Left " Ear: External ear normal.      Nose: Nose normal.      Mouth/Throat:      Mouth: Mucous membranes are moist.   Eyes:      Conjunctiva/sclera: Conjunctivae normal.   Cardiovascular:      Rate and Rhythm: Normal rate.   Pulmonary:      Effort: Pulmonary effort is normal. No respiratory distress.      Breath sounds: Normal breath sounds.   Abdominal:      General: There is no distension.   Musculoskeletal:      Right ankle: Swelling present. Tenderness present. Decreased range of motion.   Skin:     General: Skin is warm and dry.   Neurological:      General: No focal deficit present.      Mental Status: He is alert and oriented to person, place, and time. Mental status is at baseline.      Gait: Gait (gait at baseline) normal.   Psychiatric:         Judgment: Judgment normal.     Right ankle: wearing tall walking cam boot. Skin mildly edematous, tenderness to palpation.  +2 pedal pulses, sensation intact distally, neurovascular intact.+AROM with discomfort. A&Ox4,    Assessment/Plan:   1. Closed fracture of posterior malleolus of right tibia with routine healing, subsequent encounter  Encouraged to continue wearing tall walking cam boot and use crutches as directed.  Continue with Tylenol Motrin as needed for pain.  We will continue with temporary work restrictions.  At this time we will transfer care to occupational medicine and orthopedics.  Differential diagnosis, natural history, supportive care, and indications for immediate follow-up discussed.     My total time spent caring for the patient on the day of the encounter was 30 minutes.   This does not include time spent on separately billable procedures/tests.

## 2022-09-15 ENCOUNTER — OCCUPATIONAL MEDICINE (OUTPATIENT)
Dept: URGENT CARE | Facility: CLINIC | Age: 27
End: 2022-09-15
Payer: COMMERCIAL

## 2022-09-15 VITALS
SYSTOLIC BLOOD PRESSURE: 112 MMHG | BODY MASS INDEX: 20.73 KG/M2 | WEIGHT: 140 LBS | HEIGHT: 69 IN | RESPIRATION RATE: 16 BRPM | TEMPERATURE: 97.3 F | OXYGEN SATURATION: 97 % | DIASTOLIC BLOOD PRESSURE: 64 MMHG | HEART RATE: 75 BPM

## 2022-09-15 DIAGNOSIS — S82.391D CLOSED FRACTURE OF POSTERIOR MALLEOLUS OF RIGHT TIBIA WITH ROUTINE HEALING, SUBSEQUENT ENCOUNTER: ICD-10-CM

## 2022-09-15 PROCEDURE — 99213 OFFICE O/P EST LOW 20 MIN: CPT | Performed by: PHYSICIAN ASSISTANT

## 2022-09-15 NOTE — PROGRESS NOTES
"Subjective:     Flo Fuchs is a 27 y.o. male who presents for Other (WC FV DOI: 8/16/22 (R) leg pain feels worse )      DOI: 8/16/2022. \"Patient reports acute severe right ankle pain after slipping on the wet Tarmac at his work. He heard a pop when his foot slipped. He was seen on 8/18/2022 and did not file a Worker's Comp claim at that time. He had rolled his ankle 3 weeks prior but was improving until the incident at work on 8/16/2022. X-rays were performed and showed a posterior malleolus fracture.\"      Follow up today 09/15: Patient returns to the urgent care having slight improvement in his symptoms.  Does still have intermittent pain only requiring Ibuprofen as needed. Patient states he is calling today to schedule an appointment with Orthopedics. He does have the number and address. Has been wearing the tall walking cam boot as directed and using crutches.  Patient did return back to work however is going out on short-term disability but needs the required paperwork filled out. No numbness or tingling.     PMH:   No pertinent past medical history to this problem  MEDS:  Medications were reviewed in EMR  ALLERGIES:  Allergies were reviewed in EMR  SOCHX:  Works as a .   FH:   No pertinent family history to this problem       Objective:     /64 (BP Location: Right arm, Patient Position: Sitting, BP Cuff Size: Adult)   Pulse 75   Temp 36.3 °C (97.3 °F) (Temporal)   Resp 16   Ht 1.753 m (5' 9\")   Wt 63.5 kg (140 lb)   SpO2 97%   BMI 20.67 kg/m²     Constitutional: Well-appearing in no acute distress  Musculoskeletal: Right ankle.  Minimal tenderness to very light palpation to posterior ankle.  Negative erythema, edema, crepitus or deformity.  Sensation intact.  Cap refill less than 2 seconds.  Pulses 2+.    Assessment/Plan:       1. Closed fracture of posterior malleolus of right tibia with routine healing, subsequent encounter  - Referral to Occupational Medicine    Other " orders  - IBUPROFEN PO; Take  by mouth.    Released to Restricted Duty FROM 9/15/2022 TO 9/22/2022  No lifting.   Plan:  -Follow-up with occupational medicine in 1 week  -Patient is aware he needs to schedule an appointment with the [facility].  He is going to call them today or show up at the office to schedule an appointment for follow-up  -Continue nonweightbearing with crutches and Cam boot  -Continue elevation, ice application, ibuprofen as needed for pain.    Differential diagnosis, natural history, supportive care, and indications for immediate follow-up discussed.
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
## 2022-11-19 ENCOUNTER — HOSPITAL ENCOUNTER (EMERGENCY)
Facility: MEDICAL CENTER | Age: 27
End: 2022-11-19
Attending: EMERGENCY MEDICINE
Payer: MEDICAID

## 2022-11-19 VITALS
DIASTOLIC BLOOD PRESSURE: 67 MMHG | HEIGHT: 69 IN | RESPIRATION RATE: 16 BRPM | TEMPERATURE: 97.1 F | OXYGEN SATURATION: 99 % | HEART RATE: 97 BPM | WEIGHT: 142 LBS | BODY MASS INDEX: 21.03 KG/M2 | SYSTOLIC BLOOD PRESSURE: 126 MMHG

## 2022-11-19 DIAGNOSIS — S61.303A AVULSION OF NAIL OF LEFT MIDDLE FINGER: ICD-10-CM
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
## 2022-11-19 PROCEDURE — 90715 TDAP VACCINE 7 YRS/> IM: CPT | Performed by: EMERGENCY MEDICINE

## 2022-11-19 PROCEDURE — 700111 HCHG RX REV CODE 636 W/ 250 OVERRIDE (IP): Performed by: EMERGENCY MEDICINE

## 2022-11-19 PROCEDURE — 99284 EMERGENCY DEPT VISIT MOD MDM: CPT

## 2022-11-19 PROCEDURE — 90471 IMMUNIZATION ADMIN: CPT

## 2022-11-19 RX ADMIN — CLOSTRIDIUM TETANI TOXOID ANTIGEN (FORMALDEHYDE INACTIVATED), CORYNEBACTERIUM DIPHTHERIAE TOXOID ANTIGEN (FORMALDEHYDE INACTIVATED), BORDETELLA PERTUSSIS TOXOID ANTIGEN (GLUTARALDEHYDE INACTIVATED), BORDETELLA PERTUSSIS FILAMENTOUS HEMAGGLUTININ ANTIGEN (FORMALDEHYDE INACTIVATED), BORDETELLA PERTUSSIS PERTACTIN ANTIGEN, AND BORDETELLA PERTUSSIS FIMBRIAE 2/3 ANTIGEN 0.5 ML: 5; 2; 2.5; 5; 3; 5 INJECTION, SUSPENSION INTRAMUSCULAR at 22:18
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
## 2023-01-13 ENCOUNTER — OFFICE VISIT (OUTPATIENT)
Dept: URGENT CARE | Facility: CLINIC | Age: 28
End: 2023-01-13
Payer: MEDICAID

## 2023-01-13 ENCOUNTER — HOSPITAL ENCOUNTER (OUTPATIENT)
Facility: MEDICAL CENTER | Age: 28
End: 2023-01-13
Attending: PHYSICIAN ASSISTANT
Payer: MEDICAID

## 2023-01-13 VITALS
HEART RATE: 66 BPM | HEIGHT: 69 IN | OXYGEN SATURATION: 96 % | DIASTOLIC BLOOD PRESSURE: 64 MMHG | SYSTOLIC BLOOD PRESSURE: 104 MMHG | RESPIRATION RATE: 16 BRPM | TEMPERATURE: 97.9 F | WEIGHT: 143.3 LBS | BODY MASS INDEX: 21.22 KG/M2

## 2023-01-13 DIAGNOSIS — Z72.51 HIGH RISK HETEROSEXUAL BEHAVIOR: ICD-10-CM

## 2023-01-13 PROCEDURE — 99212 OFFICE O/P EST SF 10 MIN: CPT | Performed by: PHYSICIAN ASSISTANT

## 2023-01-13 PROCEDURE — 87591 N.GONORRHOEAE DNA AMP PROB: CPT

## 2023-01-13 PROCEDURE — 87491 CHLMYD TRACH DNA AMP PROBE: CPT

## 2023-01-13 ASSESSMENT — ENCOUNTER SYMPTOMS
SORE THROAT: 0
VOMITING: 0
PALPITATIONS: 0
FEVER: 0
BLURRED VISION: 0
NAUSEA: 0
DIZZINESS: 0
ABDOMINAL PAIN: 0
SHORTNESS OF BREATH: 0
FLANK PAIN: 0
CHILLS: 0

## 2023-01-13 NOTE — PROGRESS NOTES
"Subjective     Flo Fuchs is a 27 y.o. male who presents with Other (STI Testing )    HPI:  Flo Fuchs is a 27 y.o. male who presents today for STD check.  Patient reports that he has been sexually active with multiple female partners and does not always use condoms.  No current symptoms or any females with known symptoms at this time but he did have a possible exposure few months ago and wants to have another routine check.      Review of Systems   Constitutional:  Negative for chills, fever and malaise/fatigue.   HENT:  Negative for sore throat.    Eyes:  Negative for blurred vision.   Respiratory:  Negative for shortness of breath.    Cardiovascular:  Negative for chest pain and palpitations.   Gastrointestinal:  Negative for abdominal pain, nausea and vomiting.   Genitourinary:  Negative for dysuria, flank pain, frequency and urgency.   Musculoskeletal:  Negative for joint pain.   Skin:  Negative for rash.   Neurological:  Negative for dizziness.       PMH:  has no past medical history on file.  MEDS:   Current Outpatient Medications:     IBUPROFEN PO, Take  by mouth., Disp: , Rfl:   ALLERGIES: No Known Allergies  SURGHX: No past surgical history on file.  SOCHX:  reports that he has never smoked. He has never used smokeless tobacco. He reports current alcohol use of about 1.2 oz per week. He reports current drug use. Frequency: 4.00 times per week. Drug: Marijuana.  FH: Family history was reviewed, no pertinent findings to report      Objective     /64   Pulse 66   Temp 36.6 °C (97.9 °F) (Temporal)   Resp 16   Ht 1.753 m (5' 9\")   Wt 65 kg (143 lb 4.8 oz)   SpO2 96%   BMI 21.16 kg/m²      Physical Exam  Constitutional:       General: He is not in acute distress.     Appearance: He is not diaphoretic.   HENT:      Head: Normocephalic and atraumatic.      Right Ear: External ear normal.      Left Ear: External ear normal.   Eyes:      Conjunctiva/sclera: Conjunctivae " normal.      Pupils: Pupils are equal, round, and reactive to light.   Pulmonary:      Effort: Pulmonary effort is normal. No respiratory distress.   Genitourinary:     Comments: Deferred as he is currently not having any symptoms.  Musculoskeletal:      Cervical back: Normal range of motion.   Skin:     Findings: No rash.   Neurological:      Mental Status: He is alert and oriented to person, place, and time.   Psychiatric:         Mood and Affect: Mood and affect normal.         Cognition and Memory: Memory normal.         Judgment: Judgment normal.         Assessment & Plan     1. High risk heterosexual behavior  - HIV AG/AB COMBO ASSAY SCREENING; Future  - T.PALLIDUM AB ATIF (SCREENING); Future  - HEP B SURFACE AB; Future  - HEP C VIRUS ANTIBODY; Future  - HSV (HERPES SIMPLEX VIRUS) BY PCR (BLOOD); Future  - Chlamydia/GC, PCR (Urine); Future    Per patient's request we will obtain some blood work to check for HIV, syphilis, hepatitis, and HSV.  Gonorrhea and chlamydia will be sent off from his urine sample.  He is advised to abstain from any sexual contact while waiting the results.          Differential Diagnosis, natural history, and supportive care discussed. Return to the Urgent Care or follow up with your PCP if symptoms fail to resolve, or for any new or worsening symptoms. Emergency room precautions discussed. Patient and/or family appears understanding of information.

## 2023-01-14 DIAGNOSIS — Z72.51 HIGH RISK HETEROSEXUAL BEHAVIOR: ICD-10-CM

## 2023-01-15 LAB
C TRACH DNA SPEC QL NAA+PROBE: NEGATIVE
N GONORRHOEA DNA SPEC QL NAA+PROBE: NEGATIVE
SPECIMEN SOURCE: NORMAL

## 2023-02-09 ENCOUNTER — HOSPITAL ENCOUNTER (OUTPATIENT)
Facility: MEDICAL CENTER | Age: 28
End: 2023-02-09
Attending: NURSE PRACTITIONER
Payer: MEDICAID

## 2023-02-09 ENCOUNTER — OFFICE VISIT (OUTPATIENT)
Dept: URGENT CARE | Facility: CLINIC | Age: 28
End: 2023-02-09
Payer: MEDICAID

## 2023-02-09 VITALS
TEMPERATURE: 96.5 F | HEIGHT: 69 IN | BODY MASS INDEX: 21.59 KG/M2 | OXYGEN SATURATION: 98 % | DIASTOLIC BLOOD PRESSURE: 60 MMHG | WEIGHT: 145.8 LBS | HEART RATE: 64 BPM | SYSTOLIC BLOOD PRESSURE: 104 MMHG | RESPIRATION RATE: 16 BRPM

## 2023-02-09 DIAGNOSIS — R21 PENILE RASH: ICD-10-CM

## 2023-02-09 PROCEDURE — 87529 HSV DNA AMP PROBE: CPT | Mod: 91

## 2023-02-09 PROCEDURE — 99213 OFFICE O/P EST LOW 20 MIN: CPT | Performed by: NURSE PRACTITIONER

## 2023-02-09 RX ORDER — VALACYCLOVIR HYDROCHLORIDE 1 G/1
1000 TABLET, FILM COATED ORAL 2 TIMES DAILY
Qty: 14 TABLET | Refills: 0 | Status: SHIPPED | OUTPATIENT
Start: 2023-02-09 | End: 2023-02-16

## 2023-02-09 NOTE — PROGRESS NOTES
Patient has consented to treatment and for use of patient information for treatment and billing purposes.    Date: 02/09/23     Arrival Mode: Private Vehicle    Chief Complaint:    Chief Complaint   Patient presents with    Other     The patient stated he has little bumps on his penis and feels like it might be bites.  Doesn't hurt to urinate, but hurts to wash. He noticed them bout 2 days ago.        History of Present Illness: 28 y.o.  male presents to clinic with a rash on his penis that he noticed two days ago. Pt denies any new sexual partners since previous std testing one month ago. Pt states the rash started off looking like little blisters and then after showering the look like little bites. Pt states he does have mice in his house and is concerned with a bug or mouse bite. He states the rash itches and burns. It is painful when touched. No fevers or body aches. No dysuria or painful ejaculation.       ROS:    As stated in HPI     Pertinent Medical History:  No past medical history on file.     Pertinent Surgical History:  No past surgical history on file.     Pertinent Medications:    Current Outpatient Medications on File Prior to Visit   Medication Sig Dispense Refill    IBUPROFEN PO Take  by mouth.       No current facility-administered medications on file prior to visit.        Allergies:    Patient has no known allergies.     Social History:  Social History     Tobacco Use    Smoking status: Never    Smokeless tobacco: Never   Vaping Use    Vaping Use: Some days    Substances: Nicotine    Devices: Disposable   Substance Use Topics    Alcohol use: Yes     Alcohol/week: 1.2 oz     Types: 2 Shots of liquor per week    Drug use: Yes     Frequency: 4.0 times per week     Types: Marijuana        No LMP for male patient.           Physical Exam:    Vitals:    02/09/23 1206   BP: 104/60   Pulse: 64   Resp: 16   Temp: 35.8 °C (96.5 °F)   SpO2: 98%             Physical Exam  Constitutional:       General: He is  not in acute distress.     Appearance: Normal appearance. He is not ill-appearing.   HENT:      Head: Normocephalic and atraumatic.   Cardiovascular:      Rate and Rhythm: Normal rate and regular rhythm.      Heart sounds: Normal heart sounds.   Pulmonary:      Effort: Pulmonary effort is normal.   Genitourinary:     Penis: Lesions (small cluster of ulcerations on the shaft of the penis.) present.       Testes: Normal.   Lymphadenopathy:      Lower Body: No right inguinal adenopathy. No left inguinal adenopathy.   Neurological:      Mental Status: He is alert.                Diagnostics:      HSV Viral swab pending      Diagnostics interpreted by myself.  Do agree with radiology read.    Medical Decision making and clinic course :  I personally reviewed prior external notes and test results pertinent to today's visit. Pt is clinically stable at today's acute urgent care visit.  No acute distress noted. Appropriate for outpatient care at this time. Shared decision-making was utilized with patient for treatment plan. Discussed differential with pt. Suspect likely HSV infection. Will send Valtrex at this time. Swab obtained and sent to lab. Advised abstinence until results obtained. Pt recently underwent full sti testing will no obtain any other testing.      The patient remained stable during the urgent care visit.    Plan:    Medication discussed included indication for use and the potential  benefits and side effects.     1. Penile rash    - valacyclovir (VALTREX) 1 GM Tab; Take 1 Tablet by mouth 2 times a day for 7 days.  Dispense: 14 Tablet; Refill: 0  - HERPES VIRAL CULTURE       All of the patient's questions were answered to their satisfaction at the time of discharge.    Follow up:      Patient was encouraged to monitor symptoms closely. Those signs and symptoms which would warrant concern and mandate seeking a higher level of service through the emergency department discussed at length and included in  discharge papers.  Patient stated agreement and understanding of this plan of care.    Disposition:  Home in stable condition       Voice Recognition Disclaimer:  Portions of this document were created using voice recognition software. The software does have a chance of producing errors of grammar and possibly content. I have made every reasonable attempt to correct obvious errors, but there may be errors of grammar and possibly content that I did not discover before finalizing the documentation.    MARIJA Ibarra.

## 2023-02-14 LAB
HSV1 DNA CSF QL NAA+PROBE: NOT DETECTED
HSV2 DNA CSF QL NAA+PROBE: DETECTED
SPECIMEN SOURCE: ABNORMAL

## 2023-02-21 ENCOUNTER — OFFICE VISIT (OUTPATIENT)
Dept: URGENT CARE | Facility: CLINIC | Age: 28
End: 2023-02-21
Payer: MEDICAID

## 2023-02-21 ENCOUNTER — TELEPHONE (OUTPATIENT)
Dept: URGENT CARE | Facility: CLINIC | Age: 28
End: 2023-02-21

## 2023-02-21 DIAGNOSIS — R53.83 FATIGUE, UNSPECIFIED TYPE: ICD-10-CM

## 2023-02-21 LAB
FLUAV RNA SPEC QL NAA+PROBE: NEGATIVE
FLUBV RNA SPEC QL NAA+PROBE: NEGATIVE
RSV RNA SPEC QL NAA+PROBE: NEGATIVE
SARS-COV-2 RNA RESP QL NAA+PROBE: NEGATIVE

## 2023-02-21 PROCEDURE — 0241U POCT CEPHEID COV-2, FLU A/B, RSV - PCR: CPT | Performed by: FAMILY MEDICINE

## 2023-02-21 PROCEDURE — 99213 OFFICE O/P EST LOW 20 MIN: CPT | Performed by: FAMILY MEDICINE

## 2023-02-21 ASSESSMENT — ENCOUNTER SYMPTOMS
COUGH: 1
SORE THROAT: 1
CARDIOVASCULAR NEGATIVE: 1

## 2023-02-22 NOTE — PROGRESS NOTES
Subjective:   Flo Fuchs is a 28 y.o. male who presents for Body Aches (BA, nasal congestion, back pain x 1 day)      HPI    Review of Systems   Constitutional:  Positive for malaise/fatigue.   HENT:  Positive for congestion and sore throat.    Respiratory:  Positive for cough.    Cardiovascular: Negative.    Skin: Negative.      Medications, Allergies, and current problem list reviewed today in Epic.     Objective:     There were no vitals taken for this visit.    Physical Exam  Vitals and nursing note reviewed.   Constitutional:       Appearance: Normal appearance.   HENT:      Head: Normocephalic and atraumatic.      Right Ear: Tympanic membrane normal.      Left Ear: Tympanic membrane normal.      Nose: Congestion present.      Mouth/Throat:      Pharynx: Posterior oropharyngeal erythema present.   Cardiovascular:      Rate and Rhythm: Normal rate and regular rhythm.      Pulses: Normal pulses.      Heart sounds: Normal heart sounds.   Pulmonary:      Effort: Pulmonary effort is normal.      Breath sounds: Normal breath sounds.   Neurological:      Mental Status: He is alert.       Assessment/Plan:     Diagnosis and associated orders:     1. Fatigue, unspecified type  POCT CEPHEID COV-2, FLU A/B, RSV - PCR         Comments/MDM:              Differential diagnosis, natural history, supportive care, and indications for immediate follow-up discussed.    Advised the patient to follow-up with the primary care physician for recheck, reevaluation, and consideration of further management.    Please note that this dictation was created using voice recognition software. I have made a reasonable attempt to correct obvious errors, but I expect that there are errors of grammar and possibly content that I did not discover before finalizing the note.    This note was electronically signed by Oinel Pritchard M.D.

## 2023-02-22 NOTE — TELEPHONE ENCOUNTER
Returned patient call regarding test results from today's visit. Per provider's wishes, informed patient of negative test results. Patient had no other questions.

## 2023-03-07 ENCOUNTER — OFFICE VISIT (OUTPATIENT)
Dept: URGENT CARE | Facility: CLINIC | Age: 28
End: 2023-03-07
Payer: MEDICAID

## 2023-03-07 VITALS
DIASTOLIC BLOOD PRESSURE: 72 MMHG | HEART RATE: 59 BPM | SYSTOLIC BLOOD PRESSURE: 120 MMHG | HEIGHT: 69 IN | BODY MASS INDEX: 21.89 KG/M2 | RESPIRATION RATE: 14 BRPM | OXYGEN SATURATION: 97 % | TEMPERATURE: 99.4 F | WEIGHT: 147.8 LBS

## 2023-03-07 DIAGNOSIS — B00.9 HSV-2 (HERPES SIMPLEX VIRUS 2) INFECTION: ICD-10-CM

## 2023-03-07 PROCEDURE — 99213 OFFICE O/P EST LOW 20 MIN: CPT | Performed by: NURSE PRACTITIONER

## 2023-03-07 RX ORDER — ACYCLOVIR 400 MG/1
400 TABLET ORAL 3 TIMES DAILY
Qty: 30 TABLET | Refills: 0 | Status: SHIPPED | OUTPATIENT
Start: 2023-03-07 | End: 2023-03-17

## 2023-03-07 NOTE — PROGRESS NOTES
Chief Complaint   Patient presents with    Cold Sores     X 1 month-- tested for HSV II but worried because none of partners have it       HISTORY OF PRESENT ILLNESS: Patient is a pleasant 28 y.o. male who presents to urgent care today with concerns of an ongoing HSV infection.  Patient notes that he was tested for herpes 1 month ago, which resulted positive for HSV-2.  At that time he was placed on Valtrex with some improvement but not full resolve.  Patient continues to have a rash.  The rash is uncomfortable.     There are no problems to display for this patient.      Allergies:Patient has no known allergies.    Current Outpatient Medications Ordered in Epic   Medication Sig Dispense Refill    acyclovir (ZOVIRAX) 400 MG tablet Take 1 Tablet by mouth 3 times a day for 10 days. 30 Tablet 0    IBUPROFEN PO Take  by mouth.       No current Epic-ordered facility-administered medications on file.       History reviewed. No pertinent past medical history.    Social History     Tobacco Use    Smoking status: Never    Smokeless tobacco: Never   Vaping Use    Vaping Use: Some days    Substances: Nicotine, THC    Devices: Disposable   Substance Use Topics    Alcohol use: Not Currently     Comment: occ    Drug use: Yes     Frequency: 4.0 times per week     Types: Marijuana       No family status information on file.   History reviewed. No pertinent family history.    ROS:  Review of Systems   Constitutional: Negative for fever, chills, weight loss, malaise, and fatigue.   HENT: Negative for ear pain, nosebleeds, congestion, sore throat and neck pain.    Eyes: Negative for vision changes.   Neuro: Negative for headache, sensory changes, weakness, seizure, LOC.   Cardiovascular: Negative for chest pain, palpitations, orthopnea and leg swelling.   Respiratory: Negative for cough, sputum production, shortness of breath and wheezing.   Gastrointestinal: Negative for abdominal pain, nausea, vomiting or diarrhea.   Genitourinary:  "Negative for dysuria, urgency and frequency.  Musculoskeletal: Negative for falls, neck pain, back pain, joint pain, myalgias.   Skin: Positive for rash.   Negative for diaphoresis.     Exam:  /72   Pulse (!) 59   Temp 37.4 °C (99.4 °F) (Temporal)   Resp 14   Ht 1.753 m (5' 9\")   Wt 67 kg (147 lb 12.8 oz)   SpO2 97%   General: well-nourished, well-developed male in NAD  Head: normocephalic, atraumatic  Eyes: PERRLA, no conjunctival injection, acuity grossly intact, lids normal.  Ears: normal shape and symmetry, no tenderness, no discharge. External canals are without any significant edema or erythema. Tympanic membranes are without any inflammation, no effusion. Gross auditory acuity is intact.  Nose: symmetrical without tenderness, no discharge.  Mouth/Throat: reasonable hygiene, no erythema, exudates or tonsillar enlargement.  Neck: no masses, range of motion within normal limits, no tracheal deviation. No obvious thyroid enlargement.   Lymph: no cervical adenopathy. No supraclavicular adenopathy.   Neuro: alert and oriented. Cranial nerves 1-12 grossly intact. No sensory deficit.   Cardiovascular: regular rate and rhythm. No edema.  Pulmonary: no distress. Chest is symmetrical with respiration, no wheezes, crackles, or rhonchi.   : Patient deferred examination   Musculoskeletal: no clubbing, appropriate muscle tone, gait is stable.  Skin: warm, dry, intact, no clubbing, no cyanosis, no rashes.   Psych: appropriate mood, affect, judgement.         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  Initially treated with Valtrex without full resolve.  At this time we will treat with acyclovir.  Safe intercourse practices discussed. Pathogenesis of diagnosis discussed including typical length and natural progression.  Instructed to have discussion with previous partners regarding potential for " exposure.  Supportive care, differential diagnoses, and indications for immediate follow-up discussed with patient.   Instructed to return to clinic or nearest emergency department for any change in condition, further concerns, or worsening of symptoms.  Patient states understanding of the plan of care and discharge instructions.  Instructed to make an appointment, for follow up, with his primary care provider.        Please note that this dictation was created using voice recognition software. I have made every reasonable attempt to correct obvious errors, but I expect that there are errors of grammar and possibly content that I did not discover before finalizing the note. Previous clinic visit encounter reviewed and considered in medical decision making today.         MARIJA Gaffney.

## 2023-06-05 ENCOUNTER — OFFICE VISIT (OUTPATIENT)
Dept: URGENT CARE | Facility: CLINIC | Age: 28
End: 2023-06-05
Payer: MEDICAID

## 2023-06-05 ENCOUNTER — HOSPITAL ENCOUNTER (OUTPATIENT)
Facility: MEDICAL CENTER | Age: 28
End: 2023-06-05
Attending: NURSE PRACTITIONER
Payer: MEDICAID

## 2023-06-05 VITALS
SYSTOLIC BLOOD PRESSURE: 130 MMHG | OXYGEN SATURATION: 95 % | HEIGHT: 69 IN | TEMPERATURE: 98.5 F | DIASTOLIC BLOOD PRESSURE: 62 MMHG | WEIGHT: 138.5 LBS | HEART RATE: 64 BPM | BODY MASS INDEX: 20.51 KG/M2 | RESPIRATION RATE: 14 BRPM

## 2023-06-05 DIAGNOSIS — Z11.3 ROUTINE SCREENING FOR STI (SEXUALLY TRANSMITTED INFECTION): ICD-10-CM

## 2023-06-05 LAB
APPEARANCE UR: CLEAR
BILIRUB UR STRIP-MCNC: NEGATIVE MG/DL
COLOR UR AUTO: YELLOW
FORWARD REASON: SPWHY: NORMAL
FORWARDED TO LAB: SPWHR: NORMAL
GLUCOSE UR STRIP.AUTO-MCNC: NEGATIVE MG/DL
KETONES UR STRIP.AUTO-MCNC: NEGATIVE MG/DL
LEUKOCYTE ESTERASE UR QL STRIP.AUTO: NEGATIVE
NITRITE UR QL STRIP.AUTO: NEGATIVE
PH UR STRIP.AUTO: 7 [PH] (ref 5–8)
PROT UR QL STRIP: NEGATIVE MG/DL
RBC UR QL AUTO: NEGATIVE
SP GR UR STRIP.AUTO: 1.02
SPECIMEN SENT: SPWT1: NORMAL
UROBILINOGEN UR STRIP-MCNC: 2 MG/DL

## 2023-06-05 PROCEDURE — 99212 OFFICE O/P EST SF 10 MIN: CPT | Performed by: NURSE PRACTITIONER

## 2023-06-05 PROCEDURE — 3075F SYST BP GE 130 - 139MM HG: CPT | Performed by: NURSE PRACTITIONER

## 2023-06-05 PROCEDURE — 81002 URINALYSIS NONAUTO W/O SCOPE: CPT | Performed by: NURSE PRACTITIONER

## 2023-06-05 PROCEDURE — 3078F DIAST BP <80 MM HG: CPT | Performed by: NURSE PRACTITIONER

## 2023-06-05 ASSESSMENT — ENCOUNTER SYMPTOMS
NAUSEA: 0
DIZZINESS: 0
FEVER: 0
DIARRHEA: 0
EYE DISCHARGE: 0
ABDOMINAL PAIN: 0
SORE THROAT: 0
CHILLS: 0
SENSORY CHANGE: 0
VOMITING: 0
EYE REDNESS: 0
TINGLING: 0
HEADACHES: 0

## 2023-06-05 NOTE — PROGRESS NOTES
Ruslan Mckee Choice is a 28 y.o. male who presents with Sexually Transmitted Diseases (Wants screening, no symptoms)            HPI  New problem.  Patient is a 28-year-old male who presents for screening for sexually transmitted diseases.  He has no symptoms at this time to be concerned about however he is just wanting to become screened.  His primary care is through [facility].  He has no other concerns today.    Other environmental  Current Outpatient Medications on File Prior to Visit   Medication Sig Dispense Refill    IBUPROFEN PO Take  by mouth.       No current facility-administered medications on file prior to visit.     Social History     Socioeconomic History    Marital status: Single     Spouse name: Not on file    Number of children: Not on file    Years of education: Not on file    Highest education level: Not on file   Occupational History    Not on file   Tobacco Use    Smoking status: Some Days    Smokeless tobacco: Never    Tobacco comments:     Tobacco wrap for weed   Vaping Use    Vaping Use: Some days    Substances: Nicotine, THC, CBD, Flavoring    Devices: Disposable, Pre-filled or refillable cartridge   Substance and Sexual Activity    Alcohol use: Not Currently    Drug use: Yes     Frequency: 4.0 times per week     Types: Marijuana     Comment: 3-4 times/week    Sexual activity: Not Currently     Partners: Female     Birth control/protection: Condom Male   Other Topics Concern    Not on file   Social History Narrative    Not on file     Social Determinants of Health     Financial Resource Strain: Not on file   Food Insecurity: Not on file   Transportation Needs: Not on file   Physical Activity: Not on file   Stress: Not on file   Social Connections: Not on file   Intimate Partner Violence: Not on file   Housing Stability: Not on file     Breast Cancer-related family history is not on file.      Review of Systems   Constitutional:  Negative for chills,  "fever and malaise/fatigue.   HENT:  Negative for sore throat.    Eyes:  Negative for discharge and redness.   Gastrointestinal:  Negative for abdominal pain, diarrhea, nausea and vomiting.   Genitourinary: Negative.    Skin:  Negative for itching and rash.   Neurological:  Negative for dizziness, tingling, sensory change and headaches.   All other systems reviewed and are negative.             Objective     /62   Pulse 64   Temp 36.9 °C (98.5 °F) (Temporal)   Resp 14   Ht 1.749 m (5' 8.86\")   Wt 62.8 kg (138 lb 8 oz)   SpO2 95%   BMI 20.54 kg/m²      Physical Exam  Vitals reviewed.   Constitutional:       General: He is not in acute distress.     Appearance: He is well-developed.   Cardiovascular:      Rate and Rhythm: Normal rate and regular rhythm.      Heart sounds: Normal heart sounds. No murmur heard.  Pulmonary:      Effort: Pulmonary effort is normal. No respiratory distress.      Breath sounds: Normal breath sounds.   Abdominal:      General: Bowel sounds are normal.      Palpations: Abdomen is soft.      Tenderness: There is no abdominal tenderness.   Musculoskeletal:         General: Normal range of motion.      Comments: Moves all 4 extremities normally   Skin:     General: Skin is warm and dry.   Neurological:      Mental Status: He is alert and oriented to person, place, and time.   Psychiatric:         Behavior: Behavior normal.         Thought Content: Thought content normal.                             Assessment & Plan        1. Routine screening for STI (sexually transmitted infection)  Chlamydia/GC, PCR (Urine)    HIV AG/AB Combo Assay Screening    T.Pallidum AB ATIF (Screening)    Trichomonas Vaginalis by TMA    Hepatitis C Virus Antibody    HEP B Surface Antibody    Hep B Core AB Total    Hep B Surface Antigen    CANCELED: Chlamydia/GC, PCR (Urine)        Will call with results.                  "

## 2023-09-06 ENCOUNTER — OFFICE VISIT (OUTPATIENT)
Dept: URGENT CARE | Facility: CLINIC | Age: 28
End: 2023-09-06
Payer: MEDICAID

## 2023-09-06 ENCOUNTER — HOSPITAL ENCOUNTER (OUTPATIENT)
Facility: MEDICAL CENTER | Age: 28
End: 2023-09-06
Attending: NURSE PRACTITIONER
Payer: MEDICAID

## 2023-09-06 VITALS
RESPIRATION RATE: 16 BRPM | HEIGHT: 69 IN | OXYGEN SATURATION: 98 % | BODY MASS INDEX: 21.48 KG/M2 | DIASTOLIC BLOOD PRESSURE: 68 MMHG | HEART RATE: 69 BPM | TEMPERATURE: 98.1 F | WEIGHT: 145 LBS | SYSTOLIC BLOOD PRESSURE: 104 MMHG

## 2023-09-06 DIAGNOSIS — Z72.51 HIGH RISK SEXUAL BEHAVIOR, UNSPECIFIED TYPE: ICD-10-CM

## 2023-09-06 DIAGNOSIS — Z11.3 SCREENING EXAMINATION FOR STD (SEXUALLY TRANSMITTED DISEASE): ICD-10-CM

## 2023-09-06 PROCEDURE — 99212 OFFICE O/P EST SF 10 MIN: CPT | Performed by: NURSE PRACTITIONER

## 2023-09-06 PROCEDURE — 3074F SYST BP LT 130 MM HG: CPT | Performed by: NURSE PRACTITIONER

## 2023-09-06 PROCEDURE — 3078F DIAST BP <80 MM HG: CPT | Performed by: NURSE PRACTITIONER

## 2023-09-06 ASSESSMENT — ENCOUNTER SYMPTOMS: FEVER: 0

## 2023-09-07 NOTE — PROGRESS NOTES
"Subjective:   Flo Fuchs is a 28 y.o. male who presents for Exposure to STD      Sexually Transmitted Diseases  This is a new problem. Episode onset: Requesting STI screening.  No known exposures does not currently have any symptoms. Pertinent negatives include no fever, rash or urinary symptoms.       Review of Systems   Constitutional:  Negative for fever.   Genitourinary:  Negative for dysuria and urgency.   Skin:  Negative for rash.       Medications:    IBUPROFEN PO    Allergies: Other environmental    Problem List: Flo Fuchs does not have a problem list on file.    Surgical History:  No past surgical history on file.    Past Social Hx: Flo Fuchs  reports that he has been smoking. He has never used smokeless tobacco. He reports that he does not currently use alcohol. He reports current drug use. Frequency: 4.00 times per week. Drug: Marijuana.     Past Family Hx:  Flo Fuchs family history is not on file.     Problem list, medications, and allergies reviewed by myself today in Epic.     Objective:     /68 (BP Location: Left arm, Patient Position: Sitting, BP Cuff Size: Adult)   Pulse 69   Temp 36.7 °C (98.1 °F) (Temporal)   Resp 16   Ht 1.753 m (5' 9\")   Wt 65.8 kg (145 lb)   SpO2 98%   BMI 21.41 kg/m²     Physical Exam  Constitutional:       Appearance: Normal appearance. He is not ill-appearing or toxic-appearing.   HENT:      Head: Normocephalic.      Right Ear: External ear normal.      Left Ear: External ear normal.      Nose: Nose normal.      Mouth/Throat:      Lips: Pink.   Eyes:      General: Lids are normal.   Pulmonary:      Effort: Pulmonary effort is normal. No accessory muscle usage.   Genitourinary:     Comments: Deferred  Musculoskeletal:      Cervical back: Full passive range of motion without pain.   Neurological:      Mental Status: He is alert and oriented to person, place, and time.   Psychiatric:         " Mood and Affect: Mood normal.         Thought Content: Thought content normal.         Assessment/Plan:     Diagnosis and associated orders:     1. Screening examination for STD (sexually transmitted disease)  Chlamydia/GC, PCR (Urine)    T.PALLIDUM AB ATIF (SCREENING)    HIV AG/AB COMBO ASSAY SCREENING      2. High risk sexual behavior, unspecified type  Chlamydia/GC, PCR (Urine)    T.PALLIDUM AB ATIF (SCREENING)    HIV AG/AB COMBO ASSAY SCREENING         Comments/MDM:     I personally reviewed prior external notes and prior test results pertinent to today's visit.   Discussed management options, risks and benefits, and alternatives to treatment plan agreed upon.   Red flags discussed and indications to immediately call 911 or present to the Emergency Department.   Supportive care, differential diagnoses, and indications for immediate follow-up discussed with patient.    Patient expresses understanding and agrees to plan. Patient denies any other questions or concerns.              Please note that this dictation was created using voice recognition software. I have made a reasonable attempt to correct obvious errors, but I expect that there are errors of grammar and possibly content that I did not discover before finalizing the note.    This note was electronically signed by Terry HUITRON.

## 2023-10-30 ENCOUNTER — OFFICE VISIT (OUTPATIENT)
Dept: URGENT CARE | Facility: CLINIC | Age: 28
End: 2023-10-30
Payer: MEDICAID

## 2023-10-30 VITALS
SYSTOLIC BLOOD PRESSURE: 128 MMHG | HEART RATE: 58 BPM | RESPIRATION RATE: 19 BRPM | OXYGEN SATURATION: 98 % | TEMPERATURE: 97.9 F | DIASTOLIC BLOOD PRESSURE: 70 MMHG

## 2023-10-30 DIAGNOSIS — Z11.3 SCREENING EXAMINATION FOR STD (SEXUALLY TRANSMITTED DISEASE): ICD-10-CM

## 2023-10-30 DIAGNOSIS — Z86.19 HISTORY OF CHLAMYDIA INFECTION: ICD-10-CM

## 2023-10-30 LAB
APPEARANCE UR: CLEAR
BILIRUB UR STRIP-MCNC: NEGATIVE MG/DL
COLOR UR AUTO: YELLOW
GLUCOSE UR STRIP.AUTO-MCNC: NEGATIVE MG/DL
KETONES UR STRIP.AUTO-MCNC: NEGATIVE MG/DL
LEUKOCYTE ESTERASE UR QL STRIP.AUTO: NEGATIVE
NITRITE UR QL STRIP.AUTO: NEGATIVE
PH UR STRIP.AUTO: 6 [PH] (ref 5–8)
PROT UR QL STRIP: 30 MG/DL
RBC UR QL AUTO: NEGATIVE
SP GR UR STRIP.AUTO: 1.02
UROBILINOGEN UR STRIP-MCNC: 0.2 MG/DL

## 2023-10-30 PROCEDURE — 3078F DIAST BP <80 MM HG: CPT | Performed by: PHYSICIAN ASSISTANT

## 2023-10-30 PROCEDURE — 99213 OFFICE O/P EST LOW 20 MIN: CPT | Performed by: PHYSICIAN ASSISTANT

## 2023-10-30 PROCEDURE — 3074F SYST BP LT 130 MM HG: CPT | Performed by: PHYSICIAN ASSISTANT

## 2023-10-30 PROCEDURE — 81002 URINALYSIS NONAUTO W/O SCOPE: CPT | Performed by: PHYSICIAN ASSISTANT

## 2023-10-30 ASSESSMENT — ENCOUNTER SYMPTOMS
FEVER: 0
NAUSEA: 0
CHILLS: 0
VOMITING: 0

## 2023-10-31 NOTE — PROGRESS NOTES
Subjective:   Flo Fuchs is a 28 y.o. male who presents for Sexually Transmitted Diseases        Patient presents requesting STI screening.  States that he tested positive for chlamydia a month ago.  He states that the antibiotic made him nauseous and he believes that he may have thrown up 1-2 doses.  He would like to ensure the infection cleared.  He was having some mild burning with urination when he tested positive for chlamydia.  This burning has improved but he does not feel like it fully resolved.  He is otherwise feeling well-no nausea, vomiting, fevers, chills, abdominal pain.  No genital lesions or penile discharge.  Patient would like to be tested for additional STIs as well.       Review of Systems   Constitutional:  Negative for chills and fever.   Gastrointestinal:  Negative for nausea and vomiting.   Genitourinary:  Positive for dysuria.       PMH:  has no past medical history on file.  MEDS:   Current Outpatient Medications:     IBUPROFEN PO, Take  by mouth., Disp: , Rfl:   ALLERGIES:   Allergies   Allergen Reactions    Other Environmental      Dust     SURGHX: No past surgical history on file.  SOCHX:  reports that he has been smoking. He has never used smokeless tobacco. He reports that he does not currently use alcohol. He reports current drug use. Frequency: 4.00 times per week. Drug: Marijuana.  FH: Family history was reviewed, no pertinent findings to report   Objective:   /70   Pulse (!) 58   Temp 36.6 °C (97.9 °F)   Resp 19   SpO2 98%   Physical Exam  Vitals reviewed.   Constitutional:       General: He is not in acute distress.     Appearance: Normal appearance. He is well-developed. He is not toxic-appearing.   HENT:      Head: Normocephalic and atraumatic.      Right Ear: External ear normal.      Left Ear: External ear normal.      Nose: Nose normal.   Cardiovascular:      Rate and Rhythm: Normal rate and regular rhythm.      Heart sounds: Normal heart sounds, S1  normal and S2 normal.   Pulmonary:      Effort: Pulmonary effort is normal. No respiratory distress.      Breath sounds: Normal breath sounds. No stridor. No decreased breath sounds, wheezing, rhonchi or rales.   Skin:     General: Skin is dry.   Neurological:      Comments: Alert and oriented.    Psychiatric:         Speech: Speech normal.         Behavior: Behavior normal.           Assessment/Plan:   1. History of chlamydia infection    2. Screening examination for STD (sexually transmitted disease)  - POCT Urinalysis  - Chlamydia/GC, PCR (Urine); Future  - HIV AG/AB Combo Assay Screening; Future  - T.Pallidum AB ATIF (Screening); Future  - Hepatitis C Virus Antibody; Future  - HEP B Surface Antibody; Future  - Hep B Core AB Total; Future  - Hep B Surface Antigen; Future    Testing pending.  I will contact patient via [facility] with results and adjust treatment plan as indicated.  Recommend immediate reevaluation with any new or worsening symptoms.

## 2023-11-22 ENCOUNTER — OFFICE VISIT (OUTPATIENT)
Dept: URGENT CARE | Facility: CLINIC | Age: 28
End: 2023-11-22
Payer: MEDICAID

## 2023-11-22 VITALS
HEART RATE: 55 BPM | OXYGEN SATURATION: 97 % | DIASTOLIC BLOOD PRESSURE: 62 MMHG | HEIGHT: 69 IN | SYSTOLIC BLOOD PRESSURE: 102 MMHG | TEMPERATURE: 97.5 F | WEIGHT: 145 LBS | RESPIRATION RATE: 18 BRPM | BODY MASS INDEX: 21.48 KG/M2

## 2023-11-22 DIAGNOSIS — M62.830 SPASM OF THORACIC BACK MUSCLE: ICD-10-CM

## 2023-11-22 DIAGNOSIS — M62.838 TRAPEZIUS MUSCLE SPASM: ICD-10-CM

## 2023-11-22 PROCEDURE — 3074F SYST BP LT 130 MM HG: CPT | Performed by: PHYSICIAN ASSISTANT

## 2023-11-22 PROCEDURE — 99213 OFFICE O/P EST LOW 20 MIN: CPT | Performed by: PHYSICIAN ASSISTANT

## 2023-11-22 PROCEDURE — 3078F DIAST BP <80 MM HG: CPT | Performed by: PHYSICIAN ASSISTANT

## 2023-11-22 RX ORDER — CYCLOBENZAPRINE HCL 10 MG
10 TABLET ORAL EVERY 8 HOURS PRN
Qty: 30 TABLET | Refills: 0 | Status: SHIPPED | OUTPATIENT
Start: 2023-11-22 | End: 2024-03-27

## 2023-11-22 RX ORDER — METHYLPREDNISOLONE 4 MG/1
TABLET ORAL
Qty: 21 TABLET | Refills: 0 | Status: SHIPPED | OUTPATIENT
Start: 2023-11-22

## 2023-11-22 ASSESSMENT — ENCOUNTER SYMPTOMS
FOCAL WEAKNESS: 0
NECK PAIN: 1
FEVER: 0
TINGLING: 0
CHILLS: 0
SENSORY CHANGE: 0

## 2023-11-22 NOTE — PROGRESS NOTES
"Subjective:   Flo Fuchs  is a 28 y.o. male who presents for Back Pain (Neck pain, R shoulder, no known injury, x 12 hours )      Back Pain  This is a new problem. The current episode started in the past 7 days. Pertinent negatives include no fever or tingling.   Patient presents urgent care noting last day of dealing with pain to right side of neck and upper shoulder.  Patient denies recent trauma or injury.  He is right-hand dominant.  He states he has had similar pains and problems for over 2 years but has been acutely worse over the last 24 hours.  He has tried some stretching and some heat and ice with minimal change in symptoms.  He denies history of surgery or significant injury to right shoulder or neck area.    Review of Systems   Constitutional:  Negative for chills and fever.   Musculoskeletal:  Positive for neck pain.        Pain to right shoulder and neck   Neurological:  Negative for tingling, sensory change and focal weakness.       Allergies   Allergen Reactions    Other Environmental      Dust        Objective:   /62   Pulse (!) 55   Temp 36.4 °C (97.5 °F)   Resp 18   Ht 1.753 m (5' 9\")   Wt 65.8 kg (145 lb)   SpO2 97%   BMI 21.41 kg/m²     Physical Exam  Vitals and nursing note reviewed.   Constitutional:       General: He is not in acute distress.     Appearance: Normal appearance. He is well-developed. He is not diaphoretic.   HENT:      Head: Normocephalic and atraumatic.      Right Ear: External ear normal.      Left Ear: External ear normal.      Nose: Nose normal.   Eyes:      General: No scleral icterus.        Right eye: No discharge.         Left eye: No discharge.      Conjunctiva/sclera: Conjunctivae normal.   Pulmonary:      Effort: Pulmonary effort is normal. No respiratory distress.   Musculoskeletal:         General: Normal range of motion.      Right shoulder: Tenderness (Posterior, upper medial border of right scapula.  Also posterior trapezius) " present. No swelling, deformity, effusion, laceration, bony tenderness or crepitus. Normal range of motion. Normal strength (Rotator cuff 5 out of 5). Normal pulse.      Left shoulder: Normal pulse.      Cervical back: Neck supple.   Skin:     General: Skin is warm and dry.      Coloration: Skin is not pale.   Neurological:      Mental Status: He is alert and oriented to person, place, and time.      Coordination: Coordination normal.         Assessment/Plan:   1. Spasm of thoracic back muscle  - methylPREDNISolone (MEDROL DOSEPAK) 4 MG Tablet Therapy Pack; Follow schedule on package instructions.  Dispense: 21 Tablet; Refill: 0  - cyclobenzaprine (FLEXERIL) 10 mg Tab; Take 1 Tablet by mouth every 8 hours as needed for Moderate Pain.  Dispense: 30 Tablet; Refill: 0  - Referral to Orthopedics    2. Trapezius muscle spasm  - methylPREDNISolone (MEDROL DOSEPAK) 4 MG Tablet Therapy Pack; Follow schedule on package instructions.  Dispense: 21 Tablet; Refill: 0  - cyclobenzaprine (FLEXERIL) 10 mg Tab; Take 1 Tablet by mouth every 8 hours as needed for Moderate Pain.  Dispense: 30 Tablet; Refill: 0  - Referral to Orthopedics  Recommend conservative care, rest, ice, heat, work on gentle ROM exercises/stretching  Cautioned regarding potential side effects of steroid, avoid nsaids while using  Cautioned regarding potential for sedation with medication.  Follow-up with orthopedics with persistent discomfort  Return to clinic with lack of resolution or progression of symptoms.      I have worn an N95 mask, gloves and eye protection for the entire encounter with this patient.     Differential diagnosis, natural history, supportive care, and indications for immediate follow-up discussed.

## 2024-02-03 ENCOUNTER — OFFICE VISIT (OUTPATIENT)
Dept: URGENT CARE | Facility: CLINIC | Age: 29
End: 2024-02-03
Payer: MEDICAID

## 2024-02-03 ENCOUNTER — HOSPITAL ENCOUNTER (OUTPATIENT)
Facility: MEDICAL CENTER | Age: 29
End: 2024-02-03
Attending: PHYSICIAN ASSISTANT
Payer: MEDICAID

## 2024-02-03 VITALS
OXYGEN SATURATION: 98 % | TEMPERATURE: 98.6 F | SYSTOLIC BLOOD PRESSURE: 100 MMHG | BODY MASS INDEX: 20.73 KG/M2 | WEIGHT: 140 LBS | RESPIRATION RATE: 16 BRPM | DIASTOLIC BLOOD PRESSURE: 64 MMHG | HEART RATE: 62 BPM | HEIGHT: 69 IN

## 2024-02-03 DIAGNOSIS — Z11.3 ROUTINE SCREENING FOR STI (SEXUALLY TRANSMITTED INFECTION): ICD-10-CM

## 2024-02-03 PROCEDURE — 3074F SYST BP LT 130 MM HG: CPT | Performed by: PHYSICIAN ASSISTANT

## 2024-02-03 PROCEDURE — 3078F DIAST BP <80 MM HG: CPT | Performed by: PHYSICIAN ASSISTANT

## 2024-02-03 PROCEDURE — 99212 OFFICE O/P EST SF 10 MIN: CPT | Performed by: PHYSICIAN ASSISTANT

## 2024-02-03 ASSESSMENT — ENCOUNTER SYMPTOMS
FEVER: 0
ABDOMINAL PAIN: 0
NAUSEA: 0
VOMITING: 0
CHILLS: 0
FLANK PAIN: 0

## 2024-02-03 NOTE — PROGRESS NOTES
"Subjective:   Flo Fuchs  is a 29 y.o. male who presents for Sexually Transmitted Diseases (Pt declines exposure and symptoms. Pt requesting full panel )      Sexually Transmitted Diseases  Pertinent negatives include no abdominal pain, chills, fever, nausea, rash or vomiting.   Patient returns urgent care few months since last STI check.  Denies specific exposures or any current symptoms.  Denies penile discharge, dysuria, polyuria or any abnormalities of urination.  Denies rash.  Denies knowledge of any STI exposures.  Patient requests STI testing.    Review of Systems   Constitutional:  Negative for chills and fever.   Gastrointestinal:  Negative for abdominal pain, nausea and vomiting.   Genitourinary:  Negative for dysuria, flank pain, frequency, hematuria and urgency.   Skin:  Negative for rash.       Allergies   Allergen Reactions    Other Environmental      Dust        Objective:   /64 (BP Location: Left arm, Patient Position: Sitting, BP Cuff Size: Adult)   Pulse 62   Temp 37 °C (98.6 °F) (Temporal)   Resp 16   Ht 1.753 m (5' 9\")   Wt 63.5 kg (140 lb)   SpO2 98%   BMI 20.67 kg/m²     Physical Exam  Vitals and nursing note reviewed.   Constitutional:       General: He is not in acute distress.     Appearance: Normal appearance. He is well-developed. He is not diaphoretic.   HENT:      Head: Normocephalic and atraumatic.      Right Ear: External ear normal.      Left Ear: External ear normal.      Nose: Nose normal.   Eyes:      General: No scleral icterus.        Right eye: No discharge.         Left eye: No discharge.      Conjunctiva/sclera: Conjunctivae normal.   Pulmonary:      Effort: Pulmonary effort is normal. No respiratory distress.   Musculoskeletal:         General: Normal range of motion.      Cervical back: Neck supple.   Skin:     General: Skin is warm and dry.      Coloration: Skin is not pale.   Neurological:      Mental Status: He is alert and oriented to " person, place, and time.      Coordination: Coordination normal.         Assessment/Plan:   1. Routine screening for STI (sexually transmitted infection)  - HIV AG/AB COMBO ASSAY SCREENING; Future  - T.PALLIDUM AB ATIF (SCREENING); Standing  - Chlamydia/GC, PCR (Urine); Future    No rash so we will hold off on HSV testing.  MyChart for results of testing.  No empiric treatment with no symptoms or known exposures.Return to clinic with lack of resolution or progression of symptoms.        I have worn an N95 mask, gloves and eye protection for the entire encounter with this patient.     Differential diagnosis, natural history, supportive care, and indications for immediate follow-up discussed.

## 2024-02-04 LAB
FORWARD REASON: SPWHY: NORMAL
FORWARDED TO LAB: SPWHR: NORMAL
SPECIMEN SENT (2ND): SPWT2: NORMAL
SPECIMEN SENT (3RD): SPWT3: NORMAL
SPECIMEN SENT (4TH): SPWT4: NORMAL
SPECIMEN SENT: SPWT1: NORMAL

## 2024-02-23 ENCOUNTER — HOSPITAL ENCOUNTER (OUTPATIENT)
Facility: MEDICAL CENTER | Age: 29
End: 2024-02-23
Attending: STUDENT IN AN ORGANIZED HEALTH CARE EDUCATION/TRAINING PROGRAM
Payer: MEDICAID

## 2024-02-23 ENCOUNTER — OFFICE VISIT (OUTPATIENT)
Dept: URGENT CARE | Facility: CLINIC | Age: 29
End: 2024-02-23
Payer: MEDICAID

## 2024-02-23 VITALS
SYSTOLIC BLOOD PRESSURE: 108 MMHG | DIASTOLIC BLOOD PRESSURE: 80 MMHG | BODY MASS INDEX: 20.73 KG/M2 | RESPIRATION RATE: 16 BRPM | HEIGHT: 69 IN | HEART RATE: 77 BPM | TEMPERATURE: 98.2 F | OXYGEN SATURATION: 98 % | WEIGHT: 140 LBS

## 2024-02-23 DIAGNOSIS — Z11.3 SCREEN FOR STD (SEXUALLY TRANSMITTED DISEASE): ICD-10-CM

## 2024-02-23 LAB — AMBIGUOUS DTTM AMBI4: NORMAL

## 2024-02-23 PROCEDURE — 87491 CHLMYD TRACH DNA AMP PROBE: CPT

## 2024-02-23 PROCEDURE — 87591 N.GONORRHOEAE DNA AMP PROB: CPT

## 2024-02-23 PROCEDURE — 3074F SYST BP LT 130 MM HG: CPT | Performed by: STUDENT IN AN ORGANIZED HEALTH CARE EDUCATION/TRAINING PROGRAM

## 2024-02-23 PROCEDURE — 99213 OFFICE O/P EST LOW 20 MIN: CPT | Performed by: STUDENT IN AN ORGANIZED HEALTH CARE EDUCATION/TRAINING PROGRAM

## 2024-02-23 PROCEDURE — 3079F DIAST BP 80-89 MM HG: CPT | Performed by: STUDENT IN AN ORGANIZED HEALTH CARE EDUCATION/TRAINING PROGRAM

## 2024-02-23 ASSESSMENT — ENCOUNTER SYMPTOMS
DIZZINESS: 0
CONSTIPATION: 0
FLANK PAIN: 0
VOMITING: 0
NAUSEA: 0
FEVER: 0
CHILLS: 0
HEADACHES: 0
DIARRHEA: 0
ABDOMINAL PAIN: 0

## 2024-02-23 NOTE — PROGRESS NOTES
"Subjective     Flo Fuchs is a 29 y.o. male who presents with Sexually Transmitted Diseases (Would like to have a routine STD screening.)            Flo is a 29 y.o. male who presents to urgent care for STD screening.  Patient does not have any symptoms concerning for STDs and is asymptomatic.  No known exposure.  Patient states he just is wanting routine STD screening.        Review of Systems   Constitutional:  Negative for chills, fever and malaise/fatigue.   Gastrointestinal:  Negative for abdominal pain, constipation, diarrhea, nausea and vomiting.   Genitourinary:  Negative for dysuria, flank pain, frequency, hematuria and urgency.   Neurological:  Negative for dizziness and headaches.   All other systems reviewed and are negative.             Objective     /80 (BP Location: Left arm, Patient Position: Sitting, BP Cuff Size: Adult)   Pulse 77   Temp 36.8 °C (98.2 °F) (Temporal)   Resp 16   Ht 1.753 m (5' 9\")   Wt 63.5 kg (140 lb)   SpO2 (!) 0%   BMI 20.67 kg/m²      Physical Exam  Vitals reviewed.   Constitutional:       Appearance: Normal appearance.   HENT:      Head: Normocephalic and atraumatic.      Nose: Nose normal.   Eyes:      Extraocular Movements: Extraocular movements intact.      Conjunctiva/sclera: Conjunctivae normal.      Pupils: Pupils are equal, round, and reactive to light.   Cardiovascular:      Rate and Rhythm: Normal rate.   Pulmonary:      Effort: Pulmonary effort is normal.   Skin:     General: Skin is warm and dry.   Neurological:      General: No focal deficit present.      Mental Status: He is alert and oriented to person, place, and time.                             Assessment & Plan        1. Screen for STD (sexually transmitted disease)  - Chlamydia/GC, PCR (Urine); Future  - HIV AG/AB Combo Assay Screening; Future  - T.Pallidum AB ATIF (Screening); Future     Results will be available via [employer].    Patient will be contacted of any positive " results.        Instructed to return to urgent care/ER or follow up with PCP for any change in condition, further concerns, or new concerning symptoms.    Patient states understanding and agrees with the plan of care and discharge instructions.

## 2024-02-27 ENCOUNTER — HOSPITAL ENCOUNTER (OUTPATIENT)
Dept: LAB | Facility: MEDICAL CENTER | Age: 29
End: 2024-02-27
Attending: PHYSICIAN ASSISTANT
Payer: MEDICAID

## 2024-02-27 DIAGNOSIS — Z11.3 SCREENING EXAMINATION FOR STD (SEXUALLY TRANSMITTED DISEASE): ICD-10-CM

## 2024-02-27 LAB
HBV CORE AB SERPL QL IA: NONREACTIVE
HBV SURFACE AB SERPL IA-ACNC: <3.5 MIU/ML (ref 0–10)
HBV SURFACE AG SER QL: NORMAL
HCV AB SER QL: NORMAL
HIV 1+2 AB+HIV1 P24 AG SERPL QL IA: NORMAL
T PALLIDUM AB SER QL IA: NORMAL

## 2024-02-27 PROCEDURE — 36415 COLL VENOUS BLD VENIPUNCTURE: CPT

## 2024-02-27 PROCEDURE — 87591 N.GONORRHOEAE DNA AMP PROB: CPT

## 2024-02-27 PROCEDURE — 87389 HIV-1 AG W/HIV-1&-2 AB AG IA: CPT

## 2024-02-27 PROCEDURE — 86780 TREPONEMA PALLIDUM: CPT

## 2024-02-27 PROCEDURE — 87491 CHLMYD TRACH DNA AMP PROBE: CPT

## 2024-02-27 PROCEDURE — 86803 HEPATITIS C AB TEST: CPT

## 2024-02-27 PROCEDURE — 86704 HEP B CORE ANTIBODY TOTAL: CPT

## 2024-02-27 PROCEDURE — 86706 HEP B SURFACE ANTIBODY: CPT

## 2024-02-27 PROCEDURE — 87340 HEPATITIS B SURFACE AG IA: CPT

## 2024-03-27 ENCOUNTER — OFFICE VISIT (OUTPATIENT)
Dept: URGENT CARE | Facility: CLINIC | Age: 29
End: 2024-03-27
Payer: MEDICAID

## 2024-03-27 VITALS
TEMPERATURE: 98.4 F | HEIGHT: 69 IN | WEIGHT: 147 LBS | SYSTOLIC BLOOD PRESSURE: 116 MMHG | HEART RATE: 80 BPM | RESPIRATION RATE: 16 BRPM | DIASTOLIC BLOOD PRESSURE: 70 MMHG | BODY MASS INDEX: 21.77 KG/M2 | OXYGEN SATURATION: 100 %

## 2024-03-27 DIAGNOSIS — S29.019A THORACIC MYOFASCIAL STRAIN, INITIAL ENCOUNTER: ICD-10-CM

## 2024-03-27 DIAGNOSIS — M62.838 MUSCLE SPASM: ICD-10-CM

## 2024-03-27 PROCEDURE — 99213 OFFICE O/P EST LOW 20 MIN: CPT | Performed by: STUDENT IN AN ORGANIZED HEALTH CARE EDUCATION/TRAINING PROGRAM

## 2024-03-27 PROCEDURE — 3078F DIAST BP <80 MM HG: CPT | Performed by: STUDENT IN AN ORGANIZED HEALTH CARE EDUCATION/TRAINING PROGRAM

## 2024-03-27 PROCEDURE — 3074F SYST BP LT 130 MM HG: CPT | Performed by: STUDENT IN AN ORGANIZED HEALTH CARE EDUCATION/TRAINING PROGRAM

## 2024-03-27 RX ORDER — CYCLOBENZAPRINE HCL 5 MG
5-10 TABLET ORAL
Qty: 10 TABLET | Refills: 0 | Status: SHIPPED | OUTPATIENT
Start: 2024-03-27

## 2024-03-28 NOTE — PROGRESS NOTES
"Subjective:   Flo Fuchs is a 29 y.o. male who presents for Back Pain (X3days upper/mid back pain, pt states no injury )      HPI:  29-year-old male presents to the urgent care for 3 days of thoracic back pain worse on the left than on the right.  Denies any injury or trauma.  States that he has had something similar in the past that was about a year ago.  States that he did have one of his friends massaged his back and it did seem to slightly improved.  Felt like the first day it had a really bad spasm and has been extremely tight since that time.  No radiation of pain to the upper or lower extremities.  Denies any numbness, tingling, or burning.  No neck pain or shoulder pain.    Medications:    cyclobenzaprine  IBUPROFEN PO  methylPREDNISolone Tbpk    Allergies: Other environmental    Problem List: Flo Fuchs does not have a problem list on file.    Surgical History:  No past surgical history on file.    Past Social Hx: Flo Fuchs  reports that he has never smoked. He has never used smokeless tobacco. He reports that he does not currently use alcohol. He reports current drug use. Frequency: 4.00 times per week. Drug: Marijuana.     Past Family Hx:  Flo Fuchs family history is not on file.     Problem list, medications, and allergies reviewed by myself today in Epic.     Objective:     /70   Pulse 80   Temp 36.9 °C (98.4 °F) (Temporal)   Resp 16   Ht 1.753 m (5' 9\")   Wt 66.7 kg (147 lb)   SpO2 100%   BMI 21.71 kg/m²     Physical Exam  Vitals reviewed.   Constitutional:       Appearance: Normal appearance.   Cardiovascular:      Rate and Rhythm: Normal rate.      Pulses: Normal pulses.   Pulmonary:      Effort: Pulmonary effort is normal.   Musculoskeletal:      Cervical back: Normal range of motion. No tenderness.      Comments: Thoracic back: No midline spinal tenderness, crepitus, or step-offs.  Increased muscle tone just medial " to the left scapula.  Tenderness present to this area.  Full range of motion of the shoulder.  No winging of the scapula.   Neurological:      General: No focal deficit present.      Mental Status: He is alert.         Assessment/Plan:     Diagnosis and associated orders:     1. Muscle spasm  cyclobenzaprine (FLEXERIL) 5 mg tablet    Referral to Physical Therapy      2. Thoracic myofascial strain, initial encounter  cyclobenzaprine (FLEXERIL) 5 mg tablet    Referral to Physical Therapy         Comments/MDM:     Patient's presentation physical exam findings are consistent with thoracic muscle spasm and thoracic muscle strain.  Short course of muscle relaxer to reduce spasming.  Advised heat 3-5 times daily for 20 minutes at a time.  Ibuprofen/Tylenol as needed at home.  Discussed following with a phone roller or tennis ball 3 times daily.  Referral to physical therapy if symptoms do not improve with home supportive care.  Patient is agreeable to the plan.  No red flag signs.  No radicular symptoms.         Differential diagnosis, natural history, supportive care, and indications for immediate follow-up discussed.    Advised the patient to follow-up with the primary care physician for recheck, reevaluation, and consideration of further management.    Please note that this dictation was created using voice recognition software. I have made a reasonable attempt to correct obvious errors, but I expect that there are errors of grammar and possibly content that I did not discover before finalizing the note.    Electronically signed by Michel Zavaleta PA-C.

## 2024-05-10 ENCOUNTER — OFFICE VISIT (OUTPATIENT)
Dept: URGENT CARE | Facility: CLINIC | Age: 29
End: 2024-05-10
Payer: MEDICAID

## 2024-05-10 VITALS
RESPIRATION RATE: 16 BRPM | BODY MASS INDEX: 21.77 KG/M2 | HEART RATE: 57 BPM | HEIGHT: 69 IN | WEIGHT: 147 LBS | DIASTOLIC BLOOD PRESSURE: 74 MMHG | SYSTOLIC BLOOD PRESSURE: 122 MMHG | OXYGEN SATURATION: 98 % | TEMPERATURE: 97.1 F

## 2024-05-10 DIAGNOSIS — Z20.2 ENCOUNTER FOR ASSESSMENT OF STD EXPOSURE: ICD-10-CM

## 2024-05-10 PROCEDURE — 99212 OFFICE O/P EST SF 10 MIN: CPT | Performed by: PHYSICIAN ASSISTANT

## 2024-05-10 PROCEDURE — 3078F DIAST BP <80 MM HG: CPT | Performed by: PHYSICIAN ASSISTANT

## 2024-05-10 PROCEDURE — 3074F SYST BP LT 130 MM HG: CPT | Performed by: PHYSICIAN ASSISTANT

## 2024-05-10 NOTE — PROGRESS NOTES
"Subjective:   Flo Fuchs is a 29 y.o. male who presents for Requesting Labs (Pt requesting complete STI/STD panel. )   Patient presents for STD testing  No known exposure  No prior gc/chlamydia, does have history of HSV  Denies urethral discharge fevers joint pain or other symptoms  Denies risky sexual behaviors or unprotected sex    Medications:  cyclobenzaprine  IBUPROFEN PO  methylPREDNISolone Tbpk    Allergies:             Other environmental    Surgical History:       No past surgical history on file.    Past Social Hx:  Flo Fuchs  reports that he has never smoked. He has never used smokeless tobacco. He reports that he does not currently use alcohol. He reports current drug use. Frequency: 4.00 times per week. Drug: Marijuana.     Past Family Hx:   Flo Fuchs family history is not on file.       Problem list, medications, and allergies reviewed by myself today in Epic.     Objective:     /74   Pulse (!) 57   Temp 36.2 °C (97.1 °F) (Temporal)   Resp 16   Ht 1.753 m (5' 9\")   Wt 66.7 kg (147 lb)   SpO2 98%   BMI 21.71 kg/m²     Physical Exam  Vitals and nursing note reviewed.   Constitutional:       General: He is not in acute distress.     Appearance: Normal appearance. He is not ill-appearing, toxic-appearing or diaphoretic.   HENT:      Head: Normocephalic.      Right Ear: External ear normal.      Left Ear: External ear normal.      Nose: Nose normal.      Mouth/Throat:      Mouth: Mucous membranes are moist.   Eyes:      Extraocular Movements: Extraocular movements intact.      Conjunctiva/sclera: Conjunctivae normal.      Pupils: Pupils are equal, round, and reactive to light.   Cardiovascular:      Rate and Rhythm: Normal rate.      Pulses: Normal pulses.   Pulmonary:      Effort: Pulmonary effort is normal. No respiratory distress.   Genitourinary:     Comments: Exam deferred  Musculoskeletal:      Cervical back: Normal range of motion. "   Skin:     General: Skin is warm.      Findings: No lesion or rash.   Neurological:      General: No focal deficit present.      Mental Status: He is alert and oriented to person, place, and time.   Psychiatric:         Thought Content: Thought content normal.         Judgment: Judgment normal.         Assessment/Plan:     Diagnosis and Associated Orders:     1. Encounter for assessment of STD exposure  - Chlamydia/GC, PCR (Urine); Future  - HIV AG/AB COMBO ASSAY SCREENING; Future  - T.PALLIDUM AB ATIF (SCREENING); Standing        Comments/MDM:  Discussed STD diagnosis, risks and transmission.  If pt is not using protection, they are engaging in high risk sexual behavior which exposes them to HIV, syphilis, hepatitis, herpes, gonorrhea, chlamydia and trichomoniasis.  PT was encouraged to always use protection to reduce transmission of these STDs.     Pt instructed to refrain from any sexual activities or sexual contact with others until treatment is completed.      Will be in touch via MyChart with test results.  Patient advised he will need to proceed to the lab for HIV and syphilis testing.      I personally reviewed prior external notes and test results pertinent to today's visit. Supportive care, natural history, differential diagnoses, and indications for immediate follow-up discussed. Return to clinic or go to ED if symptoms worsen or persist.  Red flag symptoms discussed.  Patient/Parent/Guardian voices understanding. Follow-up with your primary care provider in 3-5 days.  All side effects of medication discussed including allergic response, GI upset, tendon injury, rash, sedation etc    Please note that this dictation was created using voice recognition software. I have made a reasonable attempt to correct obvious errors, but I expect that there are errors of grammar and possibly content that I did not discover before finalizing the note.    This note was electronically signed by Lacey Yo  ISAÍAS

## 2024-10-04 NOTE — PROGRESS NOTES
"Ruslan Fuchs is a 27 y.o. male who presents with Ankle Injury (08/16/22 twisted and felt pop when he stood again)      DOI: 8/16/2022. Patient reports acute severe right ankle pain after slipping on the wet Tarmac at his work. He heard a pop when his foot slipped. He was seen on 8/18/2022 and did not file a Worker's Comp claim at that time. He had rolled his ankle 3 weeks prior but was improving until the incident at work on 8/16/2022. X-rays were performed and showed a posterior malleolus fracture. He has been using crutched and non-weight bearing. He is using Ibuprofen for pain. Patient reports history of right foot stress fracture.      HPI    No past medical history on file.    No past surgical history on file.    No family history on file.    No Known Allergies    Medications, Allergies, and current problem list reviewed today in Epic      Review of Systems   Constitutional:  Negative for chills, fever and malaise/fatigue.   Gastrointestinal:  Negative for nausea and vomiting.   Musculoskeletal:  Positive for joint pain (right ankle pain). Negative for myalgias.   Skin:  Negative for rash.   Neurological:  Negative for tingling, sensory change, focal weakness and weakness.      All other systems reviewed and are negative.         Objective     /82   Pulse 85   Temp 36.4 °C (97.5 °F) (Temporal)   Resp 16   Ht 1.753 m (5' 9\")   Wt 63.5 kg (140 lb)   SpO2 100%   BMI 20.67 kg/m²      Physical Exam  Constitutional:       General: He is not in acute distress.     Appearance: He is not ill-appearing.   HENT:      Head: Normocephalic and atraumatic.   Eyes:      Conjunctiva/sclera: Conjunctivae normal.   Cardiovascular:      Rate and Rhythm: Normal rate and regular rhythm.   Pulmonary:      Effort: Pulmonary effort is normal. No respiratory distress.      Breath sounds: No stridor. No wheezing.   Skin:     General: Skin is warm and dry.      Findings: No rash.   Neurological: " Dr. Olmstead, pt is responding to your result note.      Cholesterol is acceptable, not as good as of last year.  TSH elevated it means that current dose of levothyroxine is not working properly for you we need to increase medication, please verify that you have been taking levothyroxine 100 mg every single day for last 2 months if you did we need to increase medication and repeat TSH level in 6 to 8 weeks again.  Normal sugar liver kidney function test, no anemia.  Please verify that you are taking levothyroxine consistently   Written by Yenny Olmstead MD on 10/2/2024 11:27 PM CDT  Seen by patient Jessie Jacobson on 10/3/2024 10:26 AM        General: No focal deficit present.      Mental Status: He is alert and oriented to person, place, and time.   Psychiatric:         Mood and Affect: Mood normal.         Behavior: Behavior normal.         Thought Content: Thought content normal.         Judgment: Judgment normal.       Vitals reviewed.  Right lower leg- TTP over ankle. Limited ROM. Leg in boot. Distal n/v intact. Ambulating with crutches.                    Assessment & Plan        1. Closed fracture of posterior malleolus of right tibia with routine healing, subsequent encounter    Restrictions per D-39  RICE  OTC NSAIDS  CAM boot   Non-weight bearing on right foot  Crutches   Referral/DEEPA to Orthopedics     - Referral to Orthopedics        Differential diagnoses, Supportive care, and indications for immediate follow-up discussed with patient.   Pathogenesis of diagnosis discussed including typical length and natural progression.   Instructed to return to clinic or nearest emergency department for any change in condition, further concerns, or worsening of symptoms.      The patient demonstrated a good understanding and agreed with the treatment plan.    Marie Thakur P.A.-C.

## 2025-05-27 ENCOUNTER — APPOINTMENT (OUTPATIENT)
Dept: RADIOLOGY | Facility: IMAGING CENTER | Age: 30
End: 2025-05-27
Attending: NURSE PRACTITIONER
Payer: MEDICAID

## 2025-05-27 ENCOUNTER — NON-PROVIDER VISIT (OUTPATIENT)
Dept: URGENT CARE | Facility: CLINIC | Age: 30
End: 2025-05-27
Payer: COMMERCIAL

## 2025-05-27 ENCOUNTER — OCCUPATIONAL MEDICINE (OUTPATIENT)
Dept: URGENT CARE | Facility: CLINIC | Age: 30
End: 2025-05-27
Payer: COMMERCIAL

## 2025-05-27 VITALS
HEART RATE: 60 BPM | RESPIRATION RATE: 17 BRPM | WEIGHT: 133.4 LBS | HEIGHT: 68 IN | TEMPERATURE: 97.5 F | DIASTOLIC BLOOD PRESSURE: 60 MMHG | SYSTOLIC BLOOD PRESSURE: 102 MMHG | BODY MASS INDEX: 20.22 KG/M2 | OXYGEN SATURATION: 97 %

## 2025-05-27 DIAGNOSIS — Z02.1 PRE-EMPLOYMENT DRUG SCREENING: ICD-10-CM

## 2025-05-27 DIAGNOSIS — S67.193A CRUSHING INJURY OF LEFT MIDDLE FINGER, INITIAL ENCOUNTER: Primary | ICD-10-CM

## 2025-05-27 DIAGNOSIS — L02.512 ABSCESS OF FINGER OF LEFT HAND: ICD-10-CM

## 2025-05-27 DIAGNOSIS — Z02.83 ENCOUNTER FOR EMPLOYMENT-RELATED DRUG TESTING: Primary | ICD-10-CM

## 2025-05-27 LAB
AMP AMPHETAMINE: NORMAL
BREATH ALCOHOL COMMENT: NORMAL
COC COCAINE: NORMAL
INT CON NEG: NEGATIVE
INT CON POS: POSITIVE
MET METHAMPHETAMINES: NORMAL
OPI OPIATES: NORMAL
PCP PHENCYCLIDINE: NORMAL
POC BREATHALIZER: 0 PERCENT (ref 0–0.01)
POC DRUG COMMENT 753798-OCCUPATIONAL HEALTH: 0
THC: NORMAL

## 2025-05-27 PROCEDURE — 3074F SYST BP LT 130 MM HG: CPT | Performed by: NURSE PRACTITIONER

## 2025-05-27 PROCEDURE — 73140 X-RAY EXAM OF FINGER(S): CPT | Mod: TC,LT | Performed by: RADIOLOGY

## 2025-05-27 PROCEDURE — 80305 DRUG TEST PRSMV DIR OPT OBS: CPT | Performed by: NURSE PRACTITIONER

## 2025-05-27 PROCEDURE — 99214 OFFICE O/P EST MOD 30 MIN: CPT | Performed by: NURSE PRACTITIONER

## 2025-05-27 PROCEDURE — 3078F DIAST BP <80 MM HG: CPT | Performed by: NURSE PRACTITIONER

## 2025-05-27 PROCEDURE — 82075 ASSAY OF BREATH ETHANOL: CPT | Performed by: NURSE PRACTITIONER

## 2025-05-27 RX ORDER — CLINDAMYCIN HYDROCHLORIDE 150 MG/1
450 CAPSULE ORAL 3 TIMES DAILY
Qty: 63 CAPSULE | Refills: 0 | Status: SHIPPED | OUTPATIENT
Start: 2025-05-27 | End: 2025-06-03

## 2025-05-27 NOTE — LETTER
"  EMPLOYEE’S CLAIM FOR COMPENSATION/ REPORT OF INITIAL TREATMENT  FORM C-4  PLEASE TYPE OR PRINT    EMPLOYEE’S CLAIM - PROVIDE ALL INFORMATION REQUESTED   First Name                    CYRUS Rossi                  Last Name  [employer] Birthdate                    1995                Sex  [x]Male Claim Number (Insurer’s Use Only)     Mailing Address  [address] Age  30 y.o. Height  1.727 m (5' 8\") Weight  60.5 kg (133 lb 6.4 oz) Social Security Number     [facility]  [address] Telephone  [phone] (home)    Email  [email or web address]    Primary Language Spoken  English    INSURER   THIRD-PARTY   [employer]   Employee's Occupation (Job Title) When Injury or Occupational Disease Occurred  [employer]    Employer's Name/Company Name  [employer]  Telephone  [phone]    Office Mail Address (Number and Street)  [address]     Date of Injury (if applicable) 5/13/2025               Hours Injury (if applicable)  2:00 PM am    pm Date Employer Notified  5/13/2025 Last Day of Work after Injury or Occupational Disease  5/26/2025 Supervisor to Whom Injury Reported  Staci   Address or Location of Accident (if applicable)  Work [1]   What were you doing at the time of accident? (if applicable)  Moving a keg on to a wilber    How did this injury or occupational disease occur? (Be specific and answer in detail. Use additional sheet if necessary)  I was leaning a keg back on my wilber to prepare to move it a replace an empty keg. The keg I was moving shifted and caught my finge between it and the wilber   If you believe that you have an occupational disease, when did you first have knowledge of the disability and its relationship to your employment?  n/a Witnesses to the Accident (if applicable)  na      Nature of Injury or Occupational Disease  Workers' Compensation  Part(s) of Body " Injured or Affected  Finger (L) Hand (L)     I CERTIFY THAT THE ABOVE IS TRUE AND CORRECT TO T HE BEST OF MY KNOWLEDGE AND THAT I HAVE PROVIDED THIS INFORMATION IN ORDER TO OBTAIN THE BENEFITS OF [place], OR [place]  I HEREBY AUTHORIZE ANY PHYSICIAN, CHIROPRACTOR, SURGEON, PRACTITIONER OR ANY OTHER PERSON, ANY HOSPITAL, INCLUDING [facility] OR [facility], ANY  MEDICAL SERVICE ORGANIZATION, ANY INSURANCE COMPANY, OR OTHER INSTITUTION OR ORGANIZATION TO RELEASE TO EACH OTHER, ANY MEDICAL OR OTHER INFORMATION, INCLUDING BENEFITS PAID OR PAYABLE, PERTINENT TO THIS INJURY OR DISEASE, EXCEPT INFORMATION RELATIVE TO DIAGNOSIS, TREATMENT AND/OR COUNSELING FOR AIDS, PSYCHOLOGICAL CONDITIONS, ALCOHOL OR CONTROLLED SUBSTANCES, FOR WHICH I MUST GIVE SPECIFIC AUTHORIZATION.  A PHOTOSTAT OF THIS AUTHORIZATION SHALL BE VALID AS THE ORIGINAL.     Date 5/27/2025   [facility] Employee’s Original or  *Electronic Signature   THIS REPORT MUST BE COMPLETED AND MAILED WITHIN 3 WORKING DAYS OF TREATMENT   [facility]    Name of [facility]   Date 5/27/2025 Diagnosis and Description of Injury or Occupational Disease  (S67.193A) Crushing injury of left middle finger, initial encounter  (primary encounter diagnosis)  (L02.512) Abscess of finger of left hand  The primary encounter diagnosis was Crushing injury of left middle finger, initial encounter. A diagnosis of Abscess of finger of left hand was also pertinent to this visit. Is there evidence that the injured employee was under the influence of alcohol and/or another controlled substance at the time of accident?  []No  [] Yes (if yes, please explain)   Hour 4:26 PM  No   Treatment:  1.  Full duty without restriction.  2.  Antibiotics as directed.  3.  Return in two weeks for reevaluation, sooner if worse.        Have you advised the patient to  remain off work five days or more?   No  [] Yes  If yes, indicate dates: From_ _                                                      To __ _  [] No   If no, is the injured employee capable of: [] full duty Yes   [] modified duty      If modified duty, specify any limitations / restrictions:__________________  ___ ___________________________     X-Ray Findings: Negative    From information given by the employee, together with medical evidence, can you directly connect this injury or occupational disease as job incurred?  []Yes   [] No Yes    Is additional medical care by a physician indicated? []Yes [] No  Yes    Do you know of any previous injury or disease contributing to this condition or occupational disease? []Yes [] No (Explain if yes)                          No   Date  5/27/2025 Print Health Care Provider’s Name  JESSICA Hickman I certify that the employer’s copy of  this form was delivered to the employer on:   Address  [address] [place] [address] INSURER'S USE ONLY                       [facility]  [address] Provider’s Tax ID Number  [id number]   Telephone  Dept: [phone]    Health Care Provider’s Original or Electronic Signature      e-MONO Carrington    Degree (MD,DO, DC,PAOsitoC,APRN)  APRN  Choose (if applicable)      ORIGINAL - TREATING HEALTHCARE PROVIDER PAGE 2 - INSURER/TPA PAGE 3 - EMPLOYER PAGE 4 - EMPLOYEE             Form C-4 (rev.02/25)

## 2025-05-28 NOTE — PROGRESS NOTES
Verbal consent was acquired by the patient to use "[employer] ambient listening note generation during this visit          Chief Complaint   Patient presents with    Finger Injury      DOI [id number] Finger [place]/DS. Patient states that he was moving a keg to the wilber, patient state his finger got caught between the keg and wilber.           History of Present Illness  The patient is a 30-year-old male who presents today for an initial evaluation of a compensation-related injury to his finger. His initial date of injury was 05/13/2025.    He sustained the injury on 05/13/2025 while at work, when his finger was inadvertently caught between a wilber and a keg during an exchange process. The incident resulted in bleeding, which was promptly addressed by the security officers who cleaned and bandaged the wound. Despite the initial treatment, he reports a lack of significant pain relief and perceives that the muscle surrounding the injured area has not been healing optimally. He experiences discomfort when attempting to make a fist and describes a sensation of fluid accumulation in the affected area. He does not believe the injury involves a fracture but rather feels it is more akin to muscle soreness. He has no known allergies to antibiotics. He has been able to continue his work duties without any restrictions.         ROS:    No severe shortness of breath   No cardiac like chest pain, as discussed   As otherwise stated in HPI    Medical/SX/ Social History:  Reviewed per chart    Pertinent Medications:    Medications Ordered Prior to Encounter[1]     Allergies:    Other environmental     Problem list, medications, and allergies reviewed by myself today in Epic     Physical Exam:    Vitals:    05/27/25 1629   BP: 102/60   Pulse: 60   Resp: 17   Temp: 36.4 °C (97.5 °F)   SpO2: 97%             Physical Exam  Vitals and nursing note reviewed.   Constitutional:       General: He is not in acute distress.     Appearance:  Normal appearance. He is not ill-appearing or toxic-appearing.   HENT:      Head: Normocephalic and atraumatic.      Nose: Nose normal.      Mouth/Throat:      Mouth: Mucous membranes are moist.      Pharynx: Oropharynx is clear.   Eyes:      Extraocular Movements: Extraocular movements intact.      Conjunctiva/sclera: Conjunctivae normal.      Pupils: Pupils are equal, round, and reactive to light.   Cardiovascular:      Rate and Rhythm: Normal rate.      Pulses: Normal pulses.   Pulmonary:      Effort: Pulmonary effort is normal.   Musculoskeletal:      Right hand: Swelling and tenderness present. Decreased range of motion.      Cervical back: Normal range of motion.      Comments: There is erythema and edema of the left middle finger.  There is an open wound with some scabbing noted.  There is pain with palpation over the area and warmth.  ROM is limited due to pain and swelling.  Sensation is intact. Capillary refill is intact.    Skin:     General: Skin is warm.      Capillary Refill: Capillary refill takes less than 2 seconds.   Neurological:      General: No focal deficit present.      Mental Status: He is alert and oriented to person, place, and time.            Diagnostics:      RADIOLOGY RESULTS   DX-FINGER(S) 2+ LEFT  Result Date: 5/27/2025 5/27/2025 5:02 PM HISTORY/REASON FOR EXAM:  Pain/Deformity Following Trauma TECHNIQUE/EXAM DESCRIPTION AND NUMBER OF VIEWS:  3 views of the LEFT fingers. COMPARISON: None FINDINGS: Soft tissue swelling about the third digit. No acute fracture or dislocation. No joint osteoarthritis.     No acute osseous abnormality.             Medical Decision making and plan :  I personally reviewed prior external notes and test results pertinent to today's visit. Pt is clinically stable at today's acute urgent care visit.  Patient appears nontoxic with no acute distress noted. Appropriate for outpatient care at this time.    Pleasant 30 y.o. male presented clinic with:      Assessment & Plan  1. Finger injury.  - The clinical presentation suggests a potential infection in the injured finger, with symptoms including pain, swelling, and difficulty making a fist.  - Physical examination indicates possible fluid accumulation and signs of infection.  - An x-ray of the affected finger will be ordered to rule out any underlying fractures.  - Antibiotics will be prescribed to manage the suspected infection. Follow-up in 2 weeks post-antibiotic therapy to ensure the infection has resolved.      Shared decision-making was utilized with patient for treatment plan. Medication discussed included indication for use and the potential benefits and side effects. Education was provided regarding the aforementioned assessments.  Differential Diagnosis, natural history, and supportive care discussed. All of the patient's questions were answered to their satisfaction at the time of discharge. Patient was encouraged to monitor symptoms closely. Those signs and symptoms which would warrant concern and mandate seeking a higher level of service through the emergency department discussed at length.  Patient stated agreement and understanding of this plan of care.    Disposition:  Home in stable condition     Voice Recognition Disclaimer:  Portions of this document were created using voice recognition software and [employer] technology provided by [employer]. The software does have a chance of producing errors of grammar and possibly content. I have made every reasonable attempt to correct obvious errors, but there may be errors of grammar and possibly content that I did not discover before finalizing the  documentation.    Bibiana Li, BOY.P.R.N.          [1]   Current Outpatient Medications on File Prior to Visit   Medication Sig Dispense Refill    cyclobenzaprine (FLEXERIL) 5 mg tablet Take 1-2 Tablets by mouth at bedtime as needed for Muscle Spasms or Moderate Pain. (Patient not taking: Reported on 5/27/2025)  10 Tablet 0    methylPREDNISolone (MEDROL DOSEPAK) 4 MG Tablet Therapy Pack Follow schedule on package instructions. (Patient not taking: Reported on 5/27/2025) 21 Tablet 0    IBUPROFEN PO Take  by mouth. (Patient not taking: Reported on 5/27/2025)       No current facility-administered medications on file prior to visit.

## 2025-06-10 ENCOUNTER — TELEPHONE (OUTPATIENT)
Dept: OCCUPATIONAL MEDICINE | Facility: CLINIC | Age: 30
End: 2025-06-10
Payer: MEDICAID

## 2025-06-11 ENCOUNTER — OCCUPATIONAL MEDICINE (OUTPATIENT)
Dept: OCCUPATIONAL MEDICINE | Facility: CLINIC | Age: 30
End: 2025-06-11
Payer: COMMERCIAL

## 2025-06-11 VITALS
WEIGHT: 130 LBS | OXYGEN SATURATION: 95 % | HEIGHT: 68 IN | TEMPERATURE: 97.7 F | HEART RATE: 73 BPM | SYSTOLIC BLOOD PRESSURE: 128 MMHG | DIASTOLIC BLOOD PRESSURE: 72 MMHG | BODY MASS INDEX: 19.7 KG/M2 | RESPIRATION RATE: 14 BRPM

## 2025-06-11 DIAGNOSIS — L02.512 ABSCESS OF FINGER OF LEFT HAND: ICD-10-CM

## 2025-06-11 DIAGNOSIS — S67.193D CRUSHING INJURY OF LEFT MIDDLE FINGER, SUBSEQUENT ENCOUNTER: Primary | ICD-10-CM

## 2025-06-11 PROCEDURE — 99212 OFFICE O/P EST SF 10 MIN: CPT | Performed by: NURSE PRACTITIONER

## 2025-06-11 ASSESSMENT — PAIN SCALES - GENERAL: PAINLEVEL_OUTOF10: 1=MINIMAL PAIN

## 2025-06-11 NOTE — PROGRESS NOTES
"Subjective:     Flo Fuchs is a 30 y.o. male who presents for Follow-Up ( DOI [id number] Finger - Better - Exam Room 18/)    DOI: 05/13/2025 LINDSEY: while at work, when his finger was inadvertently caught between a wilber and a keg during an exchange process. The incident resulted in bleeding, which was promptly addressed by the security officers who cleaned and bandaged the wound. Despite the initial treatment, he reports a lack of significant pain relief and perceives that the muscle surrounding the injured area has not been healing optimally.       Patient seen in urgent care, provided antibiotics, and imaging negative for any acute fractures or abnormalities.  He states that he is feeling significantly better.  He states he still has some soreness in the finger but his range of motion has improved and his wound has scabbed over.  He denies discharge, abnormal warmth, fever, chills, numbness, or tingling.  He completed his clindamycin with minimal difficulty.  He has not needed to take any Tylenol or ibuprofen.  He has been able to make his fist without difficulty.  He started to use his hand with some soreness but no new or worsening symptoms.  He has been working full duty with minimal difficulty.  He would like to be released from care at this time.  Plan of care discussed with patient.     ROS: All systems were reviewed on intake form, form was reviewed and signed. See scanned documents in media. Pertinent positives and negatives included in HPI.    PMH: No pertinent past medical history to this problem  MEDS: Medications were reviewed in Epic  ALLERGIES: Allergies[1]  SOCHX: Works as a beverage  at [employer]  FH: No pertinent family history to this problem       Objective:     /72   Pulse 73   Temp 36.5 °C (97.7 °F) (Temporal)   Resp 14   Ht 1.727 m (5' 8\")   Wt 59 kg (130 lb)   SpO2 95%   BMI 19.77 kg/m²     [unfilled]    Right hand: Mild swelling and tenderness " present.  Improved range of motion. There is no erythema of the left middle finger.  Healing wound with scabbing noted.  There is discomfort with palpation over the area and warmth.  FROM with discomfort.  Sensation is intact. Capillary refill is intact.       Assessment/Plan:       1. Crushing injury of left middle finger, subsequent encounter    2. Abscess of finger of left hand    Discharged/MMI  Released to full duty  Follow-up if needed          Differential diagnosis, natural history, supportive care, and indications for immediate follow-up discussed.    Approximately 30 minutes were spent in reviewing notes, preparing for visit, obtaining history, exam and evaluation, patient counseling/education and post visit documentation/orders.         [1]   Allergies  Allergen Reactions    Other Environmental      Dust

## 2025-06-11 NOTE — LETTER
PHYSICIAN’S AND CHIROPRACTIC PHYSICIAN'S   PROGRESS REPORT   CERTIFICATION OF DISABILITY Claim Number:     Social Security Number:    Patient’s Name: Flo Fuchs Date of Injury: 5/13/2025   Employer: [employer] Name of MCO (if applicable):      Patient’s Job Description/Occupation: [employer]       Previous Injuries/Diseases/Surgeries Contributing to the Condition:         Diagnosis: (S67.193D) Crushing injury of left middle finger, subsequent encounter  (primary encounter diagnosis)  (L02.512) Abscess of finger of left hand      Related to the Industrial Injury? Yes     Explain: DOI: 05/13/2025 L[place]: while at work, when his finger was inadvertently caught between a wilber and a keg during an exchange process.      Objective Medical Findings: Right hand: Mild swelling and tenderness present.  Improved range of motion. There is no erythema of the left middle finger.  Healing wound with scabbing noted.  There is discomfort with palpation over the area and warmth.  FROM with discomfort.  Sensation is intact. Capillary refill is intact.         X   None - Discharged                         Stable  Yes                 Ratable  No     X   Generally Improved                         Condition Worsened               X   Condition Same  May Have Suffered a Permanent Disability No     Treatment Plan:    Discharged/MMI  Released to full duty  Follow-up if needed         No Change in Therapy                  PT/OT Prescribed                      Medication May be Used While Working        Case Management                          PT/OT Discontinued    Consultation    Further Diagnostic Studies:    Prescription(s)           Clindamycin completed as prescribed   X  Released to FULL DUTY /No Restrictions on (Date):  6/11/2025    Certified TOTALLY TEMPORARILY DISABLED (Indicate Dates) From:   To:      Released to RESTRICTED/Modified Duty on (Date): From:   To:    Restrictions Are:         No Sitting    No Standing    No  Pulling Other:         No Bending at Waist     No Stooping     No Lifting        No Carrying     No Walking Lifting Restricted to (lbs.):          No Pushing        No Climbing     No Reaching Above Shoulders       Date of Next Visit:   Discharged/MMI  Released to full duty  Follow-up if needed Date of this Exam: 6/11/2025 Physician/Chiropractic Physician Name: JESSICA Matthew Physician/Chiropractic Physician Signature:  Steven Morataya DO MPH     [place]:  [address] [place] [address], [address] [place], [place] [address] - Telephone [phone] [place]:  [address] [place] [address], [address] [place], [place] [address] - Telephone [phone]    [email or web address]  D-39 (Rev. 10/24)